# Patient Record
Sex: FEMALE | Race: WHITE | Employment: OTHER | URBAN - METROPOLITAN AREA
[De-identification: names, ages, dates, MRNs, and addresses within clinical notes are randomized per-mention and may not be internally consistent; named-entity substitution may affect disease eponyms.]

---

## 2017-01-04 ENCOUNTER — PATIENT OUTREACH (OUTPATIENT)
Dept: INTERNAL MEDICINE CLINIC | Age: 47
End: 2017-01-04

## 2017-01-12 ENCOUNTER — TELEPHONE (OUTPATIENT)
Dept: INTERNAL MEDICINE CLINIC | Age: 47
End: 2017-01-12

## 2017-01-13 ENCOUNTER — HOSPITAL ENCOUNTER (OUTPATIENT)
Dept: GENERAL RADIOLOGY | Age: 47
Discharge: HOME | End: 2017-01-13
Attending: SPECIALIST
Payer: COMMERCIAL

## 2017-01-13 DIAGNOSIS — Z18.10 RETAINED METAL FRAGMENTS, UNSPECIFIED: ICD-10-CM

## 2017-01-13 PROCEDURE — 74000 XR ABD (KUB): CPT

## 2017-02-04 ENCOUNTER — HOSPITAL ENCOUNTER (EMERGENCY)
Age: 47
Discharge: HOME | End: 2017-02-04
Attending: EMERGENCY MEDICINE
Payer: COMMERCIAL

## 2017-02-04 ENCOUNTER — APPOINTMENT (OUTPATIENT)
Dept: ULTRASOUND IMAGING | Age: 47
End: 2017-02-04
Attending: EMERGENCY MEDICINE
Payer: COMMERCIAL

## 2017-02-04 VITALS
BODY MASS INDEX: 25.31 KG/M2 | WEIGHT: 167 LBS | SYSTOLIC BLOOD PRESSURE: 103 MMHG | HEIGHT: 68 IN | HEART RATE: 96 BPM | RESPIRATION RATE: 16 BRPM | TEMPERATURE: 98.6 F | OXYGEN SATURATION: 97 % | DIASTOLIC BLOOD PRESSURE: 59 MMHG

## 2017-02-04 DIAGNOSIS — N64.4 BREAST PAIN, RIGHT: Primary | ICD-10-CM

## 2017-02-04 PROCEDURE — 99284 EMERGENCY DEPT VISIT MOD MDM: CPT

## 2017-02-04 PROCEDURE — 76642 ULTRASOUND BREAST LIMITED: CPT

## 2017-02-04 PROCEDURE — 76641 ULTRASOUND BREAST COMPLETE: CPT

## 2017-02-04 RX ORDER — OXYCODONE AND ACETAMINOPHEN 5; 325 MG/1; MG/1
2 TABLET ORAL
Status: DISCONTINUED | OUTPATIENT
Start: 2017-02-04 | End: 2017-02-04 | Stop reason: HOSPADM

## 2017-02-04 NOTE — ED NOTES
Introduced self to patient as student nurse with primary Zuletajose Ferreira 668. Monitor x2 placed on pt. Bed in low position and locked, call bell within reach. Pt updated on status- waiting for US. Pain 5 on scale of 0-10. Pt given instructions to notify RN if unacceptable to talk about pt care in front of visitors. Pt has no new complaints at this time. Vital signs stable as noted.

## 2017-02-04 NOTE — ED NOTES
Bedside and Verbal shift change report given to DAQUAN Chavis (oncoming nurse) by Fahad Mireles (offgoing nurse). Report included the following information SBAR, Kardex, ED Summary, MAR, Recent Results and Med Rec Status.

## 2017-02-04 NOTE — DISCHARGE INSTRUCTIONS
We hope that we have addressed all of your medical concerns. The examination and treatment you received in the Emergency Department were for an emergent problem and were not intended as complete care. It is important that you follow up with your healthcare provider(s) for ongoing care. If your symptoms worsen or do not improve as expected, and you are unable to reach your usual health care provider(s), you should return to the Emergency Department. Today's healthcare is undergoing tremendous change, and patient satisfaction surveys are one of the many tools to assess the quality of medical care. You may receive a survey from the [facility] regarding your experience in the Emergency Department. I hope that your experience has been completely positive, particularly the medical care that I provided. As such, please participate in the survey; anything less than excellent does not meet my expectations or intentions. [facility] and [facility] participate in nationally recognized quality of care measures. If your blood pressure is greater than 120/80, as reported below, we urge that you seek medical care to address the potential of high blood pressure, commonly known as hypertension. Hypertension can be hereditary or can be caused by certain medical conditions, pain, stress, or \"white coat syndrome. \"       Please make an appointment with your health care provider(s) for follow up of your Emergency Department visit. VITALS:   Patient Vitals for the past 8 hrs:   Temp Pulse Resp BP SpO2   02/04/17 0815 - - - 101/65 97 %   02/04/17 0800 - - - 99/58 97 %   02/04/17 0754 - - - 97/71 97 %   02/04/17 0731 - - - - 100 %   02/04/17 0722 - - - 101/60 -   02/04/17 0555 98.6 °F (37 °C) 96 16 103/59 97 %          Thank you for allowing us to provide you with medical care today. We realize that you have many choices for your emergency care needs. Please choose us in the future for any continued health care needs. MD FLORA Garza [facility]: [phone]            No results found for this or any previous visit (from the past 24 hour(s)). No results found. Breast Pain: Care Instructions  Your Care Instructions  Breast tenderness and pain may come and go with your monthly periods (cyclic), or it may not follow any pattern (noncyclic). Breast pain is rarely caused by a serious health problem. You may need tests to find the cause. Follow-up care is a key part of your treatment and safety. Be sure to make and go to all appointments, and call your doctor if you are having problems. Its also a good idea to know your test results and keep a list of the medicines you take. How can you care for yourself at home? · If your doctor gave you medicine, take it exactly as prescribed. Call your doctor if you think you are having a problem with your medicine. · Take an over-the-counter pain medicine, such as acetaminophen (Tylenol), ibuprofen (Advil, Motrin), or naproxen (Aleve), to relieve pain and swelling. Read and follow all instructions on the label. · Do not take two or more pain medicines at the same time unless the doctor told you to. Many pain medicines have acetaminophen, which is Tylenol. Too much acetaminophen (Tylenol) can be harmful. · Wear a supportive bra, such as a sports bra or a jog bra. · Cut down on the amount of fat in your diet. If you need help planning healthy meals, see a dietitian. · Get at least 30 minutes of exercise on most days of the week. Walking is a good choice. You also may want to do other activities, such as running, swimming, cycling, or playing tennis or team sports. · Keep a healthy sleep pattern. Go to bed at the same time every night, and get up at the same time every day. When should you call for help?   Call your doctor now or seek immediate medical care if:  · You have new changes in a breast, such as:  ¨ A lump or thickening in your breast or armpit. ¨ A change in the breast's size or shape. ¨ Skin changes, such as dimples or puckers. ¨ Nipple discharge. ¨ A change in the color or feel of the skin of your breast or the darker area around the nipple (areola). ¨ A change in the shape of the nipple (it may look like it's being pulled into the breast). · You have symptoms of a breast infection, such as:  ¨ Increased pain, swelling, redness, or warmth around a breast.  ¨ Red streaks extending from the breast.  ¨ Pus draining from a breast.  ¨ A fever. Watch closely for changes in your health, and be sure to contact your doctor if:  · Your breast pain does not get better after 1 week. · You have a lump or thickening in your breast or armpit. Where can you learn more? Go to http://thea-dee.info/. Enter N469 in the search box to learn more about \"Breast Pain: Care Instructions. \"  Current as of: May 27, 2016  Content Version: 11.1  © 4249-0179 [employer]. Care instructions adapted under license by [employer] (which disclaims liability or warranty for this information). If you have questions about a medical condition or this instruction, always ask your healthcare professional. [address] any warranty or liability for your use of this information.

## 2017-02-04 NOTE — ED PROVIDER NOTES
HPI Comments: The patient is a 40 year female with a past medical history significant for multiple breast biopsies on the right side, most recent one performed approximately 3-4 months ago, who presents to the ED with increased right sided breast pain and swelling that began yesterday and progressively worsen through the evening. The patient took Tylenol for discomfort, then progress to Percocet, last night, however, she was able to only getting minimal relief of discomfort. The patient spoke to her oncologist and was instructed to come to the ER for further evaluation. She described a dull, throbbing discomfort, severity 8/10, constant, without any aggravating or relieving factors. The patient denies any fever, headache, sore throat, cough, congestion, shortness of breath, nausea, vomiting, diarrhea, constipation, dysuria, dizziness, weakness, numbness. Patient is a 40 y.o. female presenting with breast pain.    Breast pain           Past Medical History:   Diagnosis Date    Allergic rhinitis, cause unspecified     Calculus of kidney     Calculus of kidney     Colon polyp 11/14/2016    Depression     Headache(784.0)     Hepatic cyst 9/7/2016    HX OTHER MEDICAL      migraines and kidney stones    Migraine        Past Surgical History:   Procedure Laterality Date    Hx tonsil and adenoidectomy  1978    Hx colonoscopy  2016    Hx other surgical       broken right arm x2 - no surgery    Hx heent       tonsils and adenoids - once as of 11/11/2016    Colonoscopy N/A 11/14/2016     COLONOSCOPY WITH ENDOSCOPIC SUBMUCOSAL DISSECTION (ESD) performed by Violette Ndiaye MD at [address] Hx gyn       novasure procedure    Hx gyn       hysteroctomy 2016         Family History:   Problem Relation Age of Onset    Thyroid Disease Mother     Cancer Mother      skin - BCCA/squamous    Hypertension Father     Heart Disease Father     Other Father      ankylosing spondylitis    Elevated Lipids Father  Hypertension Paternal Grandmother     Elevated Lipids Paternal Grandmother     Breast Cancer Maternal Aunt     Cancer Maternal Aunt      breast    Heart Disease Paternal Grandfather        Social History     Social History    Marital status:      Spouse name: Jessy Hankins ()    Number of children: N/A    Years of education: N/A     Occupational History    Homemaker      Social History Main Topics    Smoking status: Never Smoker    Smokeless tobacco: Never Used    Alcohol use Yes      Comment: 1-2 drinks per week    Drug use: No    Sexual activity: Yes     Partners: Male     Birth control/ protection: None     Other Topics Concern    Not on file     Social History Narrative         ALLERGIES: Lortab [hydrocodone-acetaminophen]; Adhesive tape-silicones; Fioricet [butalbital-acetaminophen-caff]; and Topamax [topiramate]    Review of Systems    Vitals:    02/04/17 0555   BP: 103/59   Pulse: 96   Resp: 16   Temp: 98.6 °F (37 °C)   SpO2: 97%   Weight: 75.8 kg (167 lb)   Height: 5' 8\" (1.727 m)            Physical Exam   Nursing note and vitals reviewed. CONSTITUTIONAL: Well-appearing; well-nourished; in no apparent distress  HEAD: Normocephalic; atraumatic  EYES: PERRL; EOM intact; conjunctiva and sclera are clear bilaterally. ENT: No rhinorrhea; normal pharynx with no tonsillar hypertrophy; mucous membranes pink/moist, no erythema, no exudate. NECK: Supple; non-tender; no cervical lymphadenopathy  CARD: Normal S1, S2; no murmurs, rubs, or gallops. Regular rate and rhythm. RESP: Normal respiratory effort; breath sounds clear and equal bilaterally; no wheezes, rhonchi, or rales. ABD: Normal bowel sounds; non-distended; non-tender; no palpable organomegaly, no masses, no bruits. Back Exam: Normal inspection; no vertebral point tenderness, no CVA tenderness. Normal range of motion.   EXT: Normal ROM in all four extremities; non-tender to palpation; no swelling or deformity; distal pulses are normal, no edema. SKIN: Warm; dry; no rash. NEURO:Alert and oriented x 3, coherent, JASON-XII grossly intact, sensory and motor are non-focal.        MDM  Number of Diagnoses or Management Options  Diagnosis management comments: Assessment: 71-year-old female with worsening breast pain and swelling. The patient has a history of breast biopsies-suspect localized hematoma. Rule out hemorrhage. Plan: analgesia/ ultrasound soft tissue breast/ serial exam/ consult breast surgeon/ Monitor and Reevaluate. Amount and/or Complexity of Data Reviewed  Clinical lab tests: ordered and reviewed  Tests in the radiology section of CPT®: ordered and reviewed  Tests in the medicine section of CPT®: reviewed and ordered  Discussion of test results with the performing providers: yes  Decide to obtain previous medical records or to obtain history from someone other than the patient: yes  Obtain history from someone other than the patient: yes  Review and summarize past medical records: yes  Discuss the patient with other providers: yes  Independent visualization of images, tracings, or specimens: yes    Risk of Complications, Morbidity, and/or Mortality  Presenting problems: moderate  Diagnostic procedures: moderate  Management options: moderate      ED Course       Procedures     . CONSULT NOTE:  Jacinda Lira MD spoke with Dr. Harsha Henderson of [facility]. Discussed patient's presentation, history, physical assessment, and available diagnostic results. Wants patient discharged home and will follow up in the office for outpatient reevaluation and work-up as deemed appropriate. Progress Note:   Pt has been reexamined by Jacinda Lira MD. Pt is feeling much better. Symptoms have improved. All available results have been reviewed with pt and any available family. Pt understands sx, dx, and tx in ED. Care plan has been outlined and questions have been answered. Pt is ready to go home.  Will send home on Breast pain/ Hematoma instructions. Outpatient referral with Dr. Harsha Henderson in 2 days for reevaluation and further treatment as needed. Written by Jacinda Lira MD,7:38 AM    .   .

## 2017-02-04 NOTE — ED TRIAGE NOTES
Patient arrives with c/o RIGHT breast pain onset yesterday morning. Patient has a hx of biopsies. Patient states the pain is in the area where the first biopsy was done. Patient took 1g of Tylenol at 0500 this morning with some relief and zofran.     pts first biopsy was the first part of December 2016

## 2017-02-24 ENCOUNTER — TELEPHONE (OUTPATIENT)
Dept: INTERNAL MEDICINE CLINIC | Age: 47
End: 2017-02-24

## 2017-02-24 NOTE — TELEPHONE ENCOUNTER
Incoming call from patient advising she wanted to update  on a few things. Patient stated about 2 weeks following her hysterectomy she started to experience bleeding from her nipple. She has a breat MRI scheduled for march 8th to have everything evaluated. She has had prior testing but nothing seen to cause the bleeding. Advised patient to make sure our office gets a report of the MRI to review with . Patient voiced understanding.

## 2017-02-28 DIAGNOSIS — G43.009 NONINTRACTABLE MIGRAINE, UNSPECIFIED MIGRAINE TYPE: ICD-10-CM

## 2017-02-28 RX ORDER — RIZATRIPTAN BENZOATE 10 MG/1
10 TABLET, ORALLY DISINTEGRATING ORAL
Qty: 9 TAB | Refills: 2 | Status: SHIPPED | OUTPATIENT
Start: 2017-02-28 | End: 2019-05-06 | Stop reason: SDUPTHER

## 2017-03-14 ENCOUNTER — OFFICE VISIT (OUTPATIENT)
Dept: INTERNAL MEDICINE CLINIC | Age: 47
End: 2017-03-14

## 2017-03-14 VITALS
HEART RATE: 82 BPM | DIASTOLIC BLOOD PRESSURE: 82 MMHG | WEIGHT: 168 LBS | TEMPERATURE: 98.4 F | HEIGHT: 68 IN | OXYGEN SATURATION: 98 % | BODY MASS INDEX: 25.46 KG/M2 | RESPIRATION RATE: 18 BRPM | SYSTOLIC BLOOD PRESSURE: 120 MMHG

## 2017-03-14 DIAGNOSIS — K63.5 COLON POLYP: ICD-10-CM

## 2017-03-14 DIAGNOSIS — R63.5 WEIGHT GAIN: ICD-10-CM

## 2017-03-14 DIAGNOSIS — N64.59 BLEEDING FROM BREAST: Primary | ICD-10-CM

## 2017-03-14 NOTE — MR AVS SNAPSHOT
Visit Information Date & Time Provider Department Dept. Phone Encounter #  
 3/14/2017  1:15 PM Guillaume Radford MD [facility] [address] [id number] Follow-up Instructions Return if symptoms worsen or fail to improve. Your Appointments 3/20/2017 10:40 AM  
New Patient with Diego Ramirez MD  
[facility] ([facility]) Appt Note: RIGHT breast [email or web address] [email or web address] 3/10/17 TT  
 [place] [address]  
  
   
 [place] [address] [address] [facility] Upcoming Health Maintenance Date Due  
 PAP AKA CERVICAL CYTOLOGY 5/22/2016 COLONOSCOPY 11/14/2017 DTaP/Tdap/Td series (2 - Td) 1/1/2020 Allergies as of 3/14/2017  Review Complete On: 3/14/2017 By: Guillaume Radford MD  
  
 Severity Noted Reaction Type Reactions Lortab [Hydrocodone-acetaminophen] Medium 02/28/2011   Side Effect Nausea and Vomiting Nausea and vomiting Adhesive Tape-silicones  37/63/6238    Rash With extended use of the tape. Fioricet [Butalbital-acetaminophen-caff]  07/22/2013    Other (comments) Insomnia. Pt does not recall being allergic to this. Topamax [Topiramate]  02/28/2011    Other (comments) Memory loss - short term memory. Current Immunizations  Reviewed on 10/10/2011 Name Date Influenza Vaccine (Quad) PF 9/28/2016 10:29 AM  
 Influenza Vaccine PF 9/25/2013 Influenza Vaccine Split 10/10/2011 TDAP Vaccine 1/1/2010 Not reviewed this visit You Were Diagnosed With   
  
 Codes Comments Colon polyp    -  Primary ICD-10-CM: A48.2 ICD-9-CM: 211.3 Bleeding from breast     ICD-10-CM: N64.59 
ICD-9-CM: 611.79 Weight gain     ICD-10-CM: R63.5 ICD-9-CM: 783.1 Vitals BP Pulse Temp Resp Height(growth percentile) Weight(growth percentile) 120/82 82 98.4 °F (36.9 °C) (Oral) 18 5' 8\" (1.727 m) 168 lb (76.2 kg) LMP SpO2 BMI OB Status Smoking Status 11/02/2016 98% 25.54 kg/m2 Hysterectomy Never Smoker Vitals History BMI and BSA Data Body Mass Index Body Surface Area 25.54 kg/m 2 1.91 m 2 Preferred Pharmacy Pharmacy Name Phone [facility], [address] [phone] Your Updated Medication List  
  
   
This list is accurate as of: 3/14/17  2:14 PM.  Always use your most recent med list.  
  
  
  
  
 NASACORT 55 mcg nasal inhaler Generic drug:  triamcinolone 2 Sprays by Both Nostrils route daily. Orlistat 60 mg capsule Take 60 mg by mouth three (3) times daily (with meals). rizatriptan 10 mg disintegrating tablet Commonly known as:  MAXALT-MLT Take 1 Tab by mouth once as needed for Migraine for up to 1 dose. sertraline 100 mg tablet Commonly known as:  ZOLOFT  
TAKE ONE TABLET BY MOUTH DAILY ZyrTEC 10 mg tablet Generic drug:  cetirizine Take 10 mg by mouth daily. Follow-up Instructions Return if symptoms worsen or fail to improve. Introducing \[facility]\"" & HEALTH SERVICES! Dear Rama Dear: Thank you for requesting a Care Thread account. Our records indicate that you already have an active Care Thread account. You can access your account anytime at [email or web address] Did you know that you can access your hospital and ER discharge instructions at any time in Care Thread? You can also review all of your test results from your hospital stay or ER visit. Additional Information If you have questions, please visit the Frequently Asked Questions section of the Care Thread website at [email or web address] Remember, Care Thread is NOT to be used for urgent needs. For medical emergencies, dial 911. Now available from your iPhone and Android! Please provide this summary of care documentation to your next provider. Your primary care clinician is listed as Jad Benavides. If you have any questions after today's visit, please call [phone].

## 2017-03-14 NOTE — PROGRESS NOTES
Ofelia Braun is a 40 y.o. female who presents today for Breast Problem (benign papilloma; had biopsy; has large hematoma on left breast from biopsy) and Weight Management (discuss weight gain)      She has a history of   Patient Active Problem List   Diagnosis Code    Allergic rhinitis, cause unspecified J30.9    Calculus of kidney N20.0    Hepatic cyst K76.89    Splenic cyst D73.4    Migraine without aura and without status migrainosus, not intractable G43.009    Colon polyp K63.5    Right upper quadrant abdominal pain R10.11   . Today patient is here for follow up  . she does have other concerns. Breast Biopsy: R breast on December 1st. Had bleeding complications. Seen in Jan and had ductogram which was inconclusive. Breast MRI inconclusive and had to have this repeated. MRI then recnently repeated and biopsy of L breast was done last week. Since having a lot of bruising and pain. Still bleeding. Pt will be seeing Dr. Selena Burnett next Monday. Pt notes pain on the L breast. Large bruising. + bleeding. Pt had biopsy by Dr. Dill. Hysterectomy: done at [address] [place] [address] on 2/3/17. Had hysterectomy with bilateral salpingectomy due to cyst. Pain improved, but not resolved. Tolerable pain. Colon polyp: had this removed in November. Repeat in one year. Weight gain: notes decreased exercise tolerance. ROS  Review of Systems   Constitutional: Negative for chills, fever and weight loss. HENT: Negative for congestion and sore throat. Eyes: Negative for blurred vision, double vision and photophobia. Respiratory: Negative for cough and shortness of breath. Cardiovascular: Negative for chest pain, palpitations and leg swelling. Gastrointestinal: Negative for abdominal pain, constipation, diarrhea, heartburn, nausea and vomiting. Genitourinary: Negative for dysuria, frequency and urgency. Musculoskeletal: Positive for joint pain (L foot pain). Negative for myalgias. Skin: Negative for rash. Neurological: Negative. Negative for headaches. Endo/Heme/Allergies: Does not bruise/bleed easily. Psychiatric/Behavioral: Negative for memory loss and suicidal ideas. Visit Vitals    /82    Pulse 82    Temp 98.4 °F (36.9 °C) (Oral)    Resp 18    Ht 5' 8\" (1.727 m)    Wt 168 lb (76.2 kg)    SpO2 98%    BMI 25.54 kg/m2       Physical Exam   Constitutional: She is oriented to person, place, and time. She appears well-developed and well-nourished. No distress. HENT:   Head: Normocephalic and atraumatic. Neck: Normal range of motion. Neck supple. No thyromegaly present. Cardiovascular: Normal rate and regular rhythm. No murmur heard. Pulmonary/Chest: Effort normal and breath sounds normal. She has no wheezes. Significant bruising of entire L breast. Incision clean but still open. Abdominal: She exhibits no distension. Musculoskeletal: Normal range of motion. She exhibits no edema. Neurological: She is alert and oriented to person, place, and time. No cranial nerve deficit. Skin: Skin is warm and dry. Psychiatric: She has a normal mood and affect. Her behavior is normal.         Current Outpatient Prescriptions   Medication Sig    Orlistat 60 mg capsule Take 60 mg by mouth three (3) times daily (with meals).  rizatriptan (MAXALT-MLT) 10 mg disintegrating tablet Take 1 Tab by mouth once as needed for Migraine for up to 1 dose.  sertraline (ZOLOFT) 100 mg tablet TAKE ONE TABLET BY MOUTH DAILY    triamcinolone (NASACORT) 55 mcg nasal inhaler 2 Sprays by Both Nostrils route daily.  cetirizine (ZYRTEC) 10 mg tablet Take 10 mg by mouth daily. No current facility-administered medications for this visit.          Past Medical History:   Diagnosis Date    Allergic rhinitis, cause unspecified     Calculus of kidney     Calculus of kidney     Colon polyp 11/14/2016    Depression     Headache(784.0)     Hepatic cyst 9/7/2016    HX OTHER MEDICAL     migraines and kidney stones    Migraine       Past Surgical History:   Procedure Laterality Date    COLONOSCOPY N/A 11/14/2016    COLONOSCOPY WITH ENDOSCOPIC SUBMUCOSAL DISSECTION (ESD) performed by Devan David MD at [facility] ENDOSCOPY    HX COLONOSCOPY  2016    HX GYN      novasure procedure    HX GYN      hysteroctomy 2016    HX HEENT      tonsils and adenoids - once as of 11/11/2016    HX OTHER SURGICAL      broken right arm x2 - no surgery    HX TONSIL AND ADENOIDECTOMY  1978      Social History   Substance Use Topics    Smoking status: Never Smoker    Smokeless tobacco: Never Used    Alcohol use Yes      Comment: 1-2 drinks per week      Family History   Problem Relation Age of Onset    Thyroid Disease Mother     Cancer Mother      skin - BCCA/squamous    Hypertension Father     Heart Disease Father     Other Father      ankylosing spondylitis    Elevated Lipids Father     Hypertension Paternal Grandmother     Elevated Lipids Paternal Grandmother     Breast Cancer Maternal Aunt     Cancer Maternal Aunt      breast    Heart Disease Paternal Grandfather         Allergies   Allergen Reactions    Lortab [Hydrocodone-Acetaminophen] Nausea and Vomiting     Nausea and vomiting    Adhesive Tape-Silicones Rash     With extended use of the tape.  Fioricet [Butalbital-Acetaminophen-Caff] Other (comments)     Insomnia. Pt does not recall being allergic to this.  Topamax [Topiramate] Other (comments)     Memory loss - short term memory. Assessment/Plan  Tahir Smith was seen today for breast problem and weight management. Diagnoses and all orders for this visit:    Bleeding from breast - with very large hematoma. Concerning as this seems excessive for a biopsy. Would like seen sooner than next week by breast surgeon. Appt with Dr. Talia Snyder for Thursday. PRN tylenol at home     Colon polyp - s/p resection. Weight gain - Pt notes about 10# as she cannot exercise.  Explained that at this time her BMI is still normal. Can try orlistat. Could also otherwise discuss phentermine in the future. She would like to get back to exercising, but cannot right now due to surgeries and foot. Over 50% of a visit of 25 minutes spent reviewing diagnosis and treatment options and side effects of medicines. Other orders  -     Cancel: REFERRAL TO BREAST SURGERY      Follow-up Disposition:  Return if symptoms worsen or fail to improve.     Mg Jara MD  3/14/2017

## 2017-03-16 ENCOUNTER — OFFICE VISIT (OUTPATIENT)
Dept: SURGERY | Age: 47
End: 2017-03-16

## 2017-03-16 VITALS
DIASTOLIC BLOOD PRESSURE: 76 MMHG | HEART RATE: 70 BPM | HEIGHT: 68 IN | WEIGHT: 169 LBS | BODY MASS INDEX: 25.61 KG/M2 | SYSTOLIC BLOOD PRESSURE: 112 MMHG

## 2017-03-16 DIAGNOSIS — N64.52 DISCHARGE FROM BOTH NIPPLES: ICD-10-CM

## 2017-03-16 DIAGNOSIS — N64.89 HEMATOMA OF BREAST: Primary | ICD-10-CM

## 2017-03-16 NOTE — MR AVS SNAPSHOT
Visit Information Date & Time Provider Department Dept. Phone Encounter #  
 3/16/2017 11:00 AM [facility] [id number] Upcoming Health Maintenance Date Due  
 PAP AKA CERVICAL CYTOLOGY 5/22/2016 COLONOSCOPY 11/14/2017 DTaP/Tdap/Td series (2 - Td) 1/1/2020 Allergies as of 3/16/2017  Review Complete On: 3/16/2017 By: Isabel Robison MD  
  
 Severity Noted Reaction Type Reactions Latex  03/16/2017    Unknown (comments) Lortab [Hydrocodone-acetaminophen] Medium 02/28/2011   Side Effect Nausea and Vomiting Nausea and vomiting Adhesive Tape-silicones  98/38/1317    Rash With extended use of the tape. Fioricet [Butalbital-acetaminophen-caff]  07/22/2013    Other (comments) Insomnia. Pt does not recall being allergic to this. Topamax [Topiramate]  02/28/2011    Other (comments) Memory loss - short term memory. Current Immunizations  Reviewed on 10/10/2011 Name Date Influenza Vaccine (Quad) PF 9/28/2016 10:29 AM  
 Influenza Vaccine PF 9/25/2013 Influenza Vaccine Split 10/10/2011 TDAP Vaccine 1/1/2010 Not reviewed this visit You Were Diagnosed With   
  
 Codes Comments Hematoma of breast    -  Primary ICD-10-CM: U86.06 ICD-9-CM: 611.89 Discharge from both nipples     ICD-10-CM: N64.52 
ICD-9-CM: 611.79 Vitals BP Pulse Height(growth percentile) Weight(growth percentile) LMP BMI  
 112/76 70 5' 8\" (1.727 m) 169 lb (76.7 kg) 11/02/2016 25.7 kg/m2 OB Status Smoking Status Hysterectomy Never Smoker Vitals History BMI and BSA Data Body Mass Index Body Surface Area 25.7 kg/m 2 1.92 m 2 Preferred Pharmacy Pharmacy Name Phone [facility] [address], [address] [phone] Your Updated Medication List  
  
   
 This list is accurate as of: 3/16/17  3:20 PM.  Always use your most recent med list.  
  
  
  
  
 NASACORT 55 mcg nasal inhaler Generic drug:  triamcinolone 2 Sprays by Both Nostrils route daily. Orlistat 60 mg capsule Take 60 mg by mouth three (3) times daily (with meals). rizatriptan 10 mg disintegrating tablet Commonly known as:  MAXALT-MLT Take 1 Tab by mouth once as needed for Migraine for up to 1 dose. sertraline 100 mg tablet Commonly known as:  ZOLOFT  
TAKE ONE TABLET BY MOUTH DAILY ZyrTEC 10 mg tablet Generic drug:  cetirizine Take 10 mg by mouth daily. Patient Instructions Breast Self-Exam: Care Instructions Your Care Instructions A breast self-exam is when you check your breasts for lumps or changes. This regular exam helps you learn how your breasts normally look and feel. Most breast problems or changes are not because of cancer. Breast self-exam is not a substitute for a mammogram. Having regular breast exams by your doctor and regular mammograms improve your chances of finding any problems with your breasts. Some women set a time each month to do a step-by-step breast self-exam. Other women like a less formal system. They might look at their breasts as they brush their teeth, or feel their breasts once in a while in the shower. If you notice a change in your breast, tell your doctor. Follow-up care is a key part of your treatment and safety. Be sure to make and go to all appointments, and call your doctor if you are having problems. Its also a good idea to know your test results and keep a list of the medicines you take. How do you do a breast self-exam? 
· The best time to examine your breasts is usually one week after your menstrual period begins. Your breasts should not be tender then. If you do not have periods, you might do your exam on a day of the month that is easy to remember. · To examine your breasts: ¨ Remove all your clothes above the waist and lie down. When you are lying down, your breast tissue spreads evenly over your chest wall, which makes it easier to feel all your breast tissue. ¨ Use the padsnot the fingertipsof the 3 middle fingers of your left hand to check your right breast. Move your fingers slowly in small coin-sized circles that overlap. ¨ Use three levels of pressure to feel of all your breast tissue. Use light pressure to feel the tissue close to the skin surface. Use medium pressure to feel a little deeper. Use firm pressure to feel your tissue close to your breastbone and ribs. Use each pressure level to feel your breast tissue before moving on to the next spot. ¨ Check your entire breast, moving up and down as if following a strip from the collarbone to the bra line, and from the armpit to the ribs. Repeat until you have covered the entire breast. 
¨ Repeat this procedure for your left breast, using the pads of the 3 middle fingers of your right hand. · To examine your breasts while in the shower: 
¨ Place one arm over your head and lightly soap your breast on that side. ¨ Using the pads of your fingers, gently move your hand over your breast (in the strip pattern described above), feeling carefully for any lumps or changes. ¨ Repeat for the other breast. 
· Have your doctor inspect anything you notice to see if you need further testing. Where can you learn more? Go to http://thea-dee.info/. Enter P148 in the search box to learn more about \"Breast Self-Exam: Care Instructions. \" Current as of: July 26, 2016 Content Version: 11.1 © 9530-1820 Debt Resolve. Care instructions adapted under license by [employer] (which disclaims liability or warranty for this information).  If you have questions about a medical condition or this instruction, always ask your healthcare professional. [employer] disclaims any warranty or liability for your use of this information. Breast Self-Exam: Care Instructions Your Care Instructions A breast self-exam is when you check your breasts for lumps or changes. This regular exam helps you learn how your breasts normally look and feel. Most breast problems or changes are not because of cancer. Breast self-exam is not a substitute for a mammogram. Having regular breast exams by your doctor and regular mammograms improve your chances of finding any problems with your breasts. Some women set a time each month to do a step-by-step breast self-exam. Other women like a less formal system. They might look at their breasts as they brush their teeth, or feel their breasts once in a while in the shower. If you notice a change in your breast, tell your doctor. Follow-up care is a key part of your treatment and safety. Be sure to make and go to all appointments, and call your doctor if you are having problems. Its also a good idea to know your test results and keep a list of the medicines you take. How do you do a breast self-exam? 
· The best time to examine your breasts is usually one week after your menstrual period begins. Your breasts should not be tender then. If you do not have periods, you might do your exam on a day of the month that is easy to remember. · To examine your breasts: ¨ Remove all your clothes above the waist and lie down. When you are lying down, your breast tissue spreads evenly over your chest wall, which makes it easier to feel all your breast tissue. ¨ Use the padsnot the fingertipsof the 3 middle fingers of your left hand to check your right breast. Move your fingers slowly in small coin-sized circles that overlap. ¨ Use three levels of pressure to feel of all your breast tissue. Use light pressure to feel the tissue close to the skin surface. Use medium pressure to feel a little deeper.  Use firm pressure to feel your tissue close to your breastbone and ribs. Use each pressure level to feel your breast tissue before moving on to the next spot. ¨ Check your entire breast, moving up and down as if following a strip from the collarbone to the bra line, and from the armpit to the ribs. Repeat until you have covered the entire breast. 
¨ Repeat this procedure for your left breast, using the pads of the 3 middle fingers of your right hand. · To examine your breasts while in the shower: 
¨ Place one arm over your head and lightly soap your breast on that side. ¨ Using the pads of your fingers, gently move your hand over your breast (in the strip pattern described above), feeling carefully for any lumps or changes. ¨ Repeat for the other breast. 
· Have your doctor inspect anything you notice to see if you need further testing. Where can you learn more? Go to http://thea-dee.info/. Enter P148 in the search box to learn more about \"Breast Self-Exam: Care Instructions. \" Current as of: July 26, 2016 Content Version: 11.1 © 9903-1123 [employer]. Care instructions adapted under license by [employer] (which disclaims liability or warranty for this information). If you have questions about a medical condition or this instruction, always ask your healthcare professional. [address] any warranty or liability for your use of this information. Introducing [facility]! Dear Tani Cox: Thank you for requesting a Tributes.com account. Our records indicate that you already have an active [email or web address] account. You can access your account anytime at [email or web address] Did you know that you can access your hospital and ER discharge instructions at any time in [email or web address]? You can also review all of your test results from your hospital stay or ER visit. Additional Information If you have questions, please visit the Frequently Asked Questions section of the Toonimo website at [email or web address] Remember, Toonimo is NOT to be used for urgent needs. For medical emergencies, dial 911. Now available from your iPhone and Android! Please provide this summary of care documentation to your next provider. Your primary care clinician is listed as Jad Benavides. If you have any questions after today's visit, please call [phone].

## 2017-03-16 NOTE — PROGRESS NOTES
HISTORY OF PRESENT ILLNESS  Jamia Bustillo is a 40 y.o. female. HPI NEW patient consult referred by Dr. Vicki Scruggs for possible papilloma. She had a routine mammogram 2016 at [facility] and had an ultrasound guided RIGHT benign biopsy in December. She had extensive bruising from the biopsy and some RIGHT nipple bloody discharge. She was sent for a ductogram, stereotactic biopsy, MRI and 2 more MRI guided biopsies. She has extensive bruising in the LEFT breast after her MRI biopsy and now has LEFT nipple bloody discharge. The RIGHT nipple discharge has resolved. [address]  2016 mammogram.  BIRADS 0.   2016 Ultrasound BIRADS 4: RIGHT 1cm complex cyst 10:00. Biopsy 2016: benign breast tissue  2017 Pt presented with RIGHT bloody discharge after her breast biopsy and was sent for a ductogram.  2017 stereotactic biopsy for 'right ductal narrowing': fibrocystic change  2017 breast MRI  BIRADS 4. 4 biopsies recommend  (2 LEFT and 2 RIGHT)  3/8/2017 MRI biopsy RIGHT 2:00: benign  3/9/2017 MRI biopsy LEFT 4:00: benign  Obstetric History       T2      TAB0   SAB0   E0   M0   L2    Obstetric Comments   Menarche:  14. LMP:2016  # of Children: 2  Age at Delivery of First Child: 40   Hysterectomy/oophorectomy: yes/no. Breast Bx: yes one on right. , 3 on left  Hx of Breast Feeding:  yes. BCP:  yes. Hormone therapy:  no.     Family History:  Maternal aunt had breast cancer. Mammogram at [facility], 2016, BIRADS 4  Breast MRI, 2017, BIRADS 4    Review of Systems   Constitutional: Negative. HENT: Negative. Eyes: Negative. Respiratory: Negative. Cardiovascular: Negative. Gastrointestinal: Positive for abdominal pain. Genitourinary: Negative. Musculoskeletal: Negative. Skin: Negative. Neurological: Negative. Endo/Heme/Allergies: Negative. Psychiatric/Behavioral: The patient is nervous/anxious.         Physical Exam Pulmonary/Chest: Right breast exhibits skin change (resolving ecchymosis UIQ, biopsy scar LOQ). Right breast exhibits no inverted nipple, no mass, no nipple discharge and no tenderness. Left breast exhibits mass (palpable hematoma LOQ), skin change (purple ecchymosis after MRI biopsy) and tenderness. Left breast exhibits no inverted nipple and no nipple discharge. Breasts are symmetrical.       Lymphadenopathy:     She has no cervical adenopathy. She has no axillary adenopathy. Right: No supraclavicular adenopathy present. Left: No supraclavicular adenopathy present. ASSESSMENT and PLAN    ICD-10-CM ICD-9-CM    1. Hematoma of breast N64.89 611.89    2. Discharge from both nipples N64.52 611.79        1. The discharge from the nipples is old blood from hematomas caused by breast biopsies. Pt did not have bloody nipple discharge before her biopsies. No evidence of a papilloma. RIGHT nipple discharge has resolved. LEFT nipple discharge will resolve when her hematoma is re-absorbed. It is unfortunate the traumatic bloody discharge led to a ductogram and another biopsy and an MRI and more benign biopsies. Resume yearly mammograms.

## 2017-03-16 NOTE — COMMUNICATION BODY
HISTORY OF PRESENT ILLNESS  Adair Montez is a 40 y.o. female. HPI NEW patient consult referred by Dr. Rupal Wood for possible papilloma. She had a routine mammogram 2016 at [facility] and had an ultrasound guided RIGHT benign biopsy in December. She had extensive bruising from the biopsy and some RIGHT nipple bloody discharge. She was sent for a ductogram, stereotactic biopsy, MRI and 2 more MRI guided biopsies. She has extensive bruising in the LEFT breast after her MRI biopsy and now has LEFT nipple bloody discharge. The RIGHT nipple discharge has resolved. [address]  2016 mammogram.  BIRADS 0.   2016 Ultrasound BIRADS 4: RIGHT 1cm complex cyst 10:00. Biopsy 2016: benign breast tissue  2017 Pt presented with RIGHT bloody discharge after her breast biopsy and was sent for a ductogram.  2017 stereotactic biopsy for 'right ductal narrowing': fibrocystic change  2017 breast MRI  BIRADS 4. 4 biopsies recommend  (2 LEFT and 2 RIGHT)  3/8/2017 MRI biopsy RIGHT 2:00: benign  3/9/2017 MRI biopsy LEFT 4:00: benign  Obstetric History       T2      TAB0   SAB0   E0   M0   L2    Obstetric Comments   Menarche:  14. LMP:2016  # of Children: 2  Age at Delivery of First Child: 40   Hysterectomy/oophorectomy: yes/no. Breast Bx: yes one on right. , 3 on left  Hx of Breast Feeding:  yes. BCP:  yes. Hormone therapy:  no.     Family History:  Maternal aunt had breast cancer. Mammogram at [facility], 2016, BIRADS 4  Breast MRI, 2017, BIRADS 4    Review of Systems   Constitutional: Negative. HENT: Negative. Eyes: Negative. Respiratory: Negative. Cardiovascular: Negative. Gastrointestinal: Positive for abdominal pain. Genitourinary: Negative. Musculoskeletal: Negative. Skin: Negative. Neurological: Negative. Endo/Heme/Allergies: Negative. Psychiatric/Behavioral: The patient is nervous/anxious.         Physical Exam Pulmonary/Chest: Right breast exhibits skin change (resolving ecchymosis UIQ, biopsy scar LOQ). Right breast exhibits no inverted nipple, no mass, no nipple discharge and no tenderness. Left breast exhibits mass (palpable hematoma LOQ), skin change (purple ecchymosis after MRI biopsy) and tenderness. Left breast exhibits no inverted nipple and no nipple discharge. Breasts are symmetrical.       Lymphadenopathy:     She has no cervical adenopathy. She has no axillary adenopathy. Right: No supraclavicular adenopathy present. Left: No supraclavicular adenopathy present. ASSESSMENT and PLAN    ICD-10-CM ICD-9-CM    1. Hematoma of breast N64.89 611.89    2. Discharge from both nipples N64.52 611.79        1. The discharge from the nipples is old blood from hematomas caused by breast biopsies. Pt did not have bloody nipple discharge before her biopsies. No evidence of a papilloma. RIGHT nipple discharge has resolved. LEFT nipple discharge will resolve when her hematoma is re-absorbed. It is unfortunate the traumatic bloody discharge led to a ductogram and another biopsy and an MRI and more benign biopsies. Resume yearly mammograms.

## 2017-03-16 NOTE — PATIENT INSTRUCTIONS
Breast Self-Exam: Care Instructions  Your Care Instructions  A breast self-exam is when you check your breasts for lumps or changes. This regular exam helps you learn how your breasts normally look and feel. Most breast problems or changes are not because of cancer. Breast self-exam is not a substitute for a mammogram. Having regular breast exams by your doctor and regular mammograms improve your chances of finding any problems with your breasts. Some women set a time each month to do a step-by-step breast self-exam. Other women like a less formal system. They might look at their breasts as they brush their teeth, or feel their breasts once in a while in the shower. If you notice a change in your breast, tell your doctor. Follow-up care is a key part of your treatment and safety. Be sure to make and go to all appointments, and call your doctor if you are having problems. Its also a good idea to know your test results and keep a list of the medicines you take. How do you do a breast self-exam?  · The best time to examine your breasts is usually one week after your menstrual period begins. Your breasts should not be tender then. If you do not have periods, you might do your exam on a day of the month that is easy to remember. · To examine your breasts:  ¨ Remove all your clothes above the waist and lie down. When you are lying down, your breast tissue spreads evenly over your chest wall, which makes it easier to feel all your breast tissue. ¨ Use the pads--not the fingertips--of the 3 middle fingers of your left hand to check your right breast. Move your fingers slowly in small coin-sized circles that overlap. ¨ Use three levels of pressure to feel of all your breast tissue. Use light pressure to feel the tissue close to the skin surface. Use medium pressure to feel a little deeper. Use firm pressure to feel your tissue close to your breastbone and ribs.  Use each pressure level to feel your breast tissue before moving on to the next spot. ¨ Check your entire breast, moving up and down as if following a strip from the collarbone to the bra line, and from the armpit to the ribs. Repeat until you have covered the entire breast.  ¨ Repeat this procedure for your left breast, using the pads of the 3 middle fingers of your right hand. · To examine your breasts while in the shower:  ¨ Place one arm over your head and lightly soap your breast on that side. ¨ Using the pads of your fingers, gently move your hand over your breast (in the strip pattern described above), feeling carefully for any lumps or changes. ¨ Repeat for the other breast.  · Have your doctor inspect anything you notice to see if you need further testing. Where can you learn more? Go to http://thea-dee.info/. Enter P148 in the search box to learn more about \"Breast Self-Exam: Care Instructions. \"  Current as of: July 26, 2016  Content Version: 11.1  © 1572-5839 Property Partner. Care instructions adapted under license by [employer] (which disclaims liability or warranty for this information). If you have questions about a medical condition or this instruction, always ask your healthcare professional. [place] [address] any warranty or liability for your use of this information. Breast Self-Exam: Care Instructions  Your Care Instructions  A breast self-exam is when you check your breasts for lumps or changes. This regular exam helps you learn how your breasts normally look and feel. Most breast problems or changes are not because of cancer. Breast self-exam is not a substitute for a mammogram. Having regular breast exams by your doctor and regular mammograms improve your chances of finding any problems with your breasts. Some women set a time each month to do a step-by-step breast self-exam. Other women like a less formal system.  They might look at their breasts as they brush their teeth, or feel their breasts once in a while in the shower. If you notice a change in your breast, tell your doctor. Follow-up care is a key part of your treatment and safety. Be sure to make and go to all appointments, and call your doctor if you are having problems. Its also a good idea to know your test results and keep a list of the medicines you take. How do you do a breast self-exam?  · The best time to examine your breasts is usually one week after your menstrual period begins. Your breasts should not be tender then. If you do not have periods, you might do your exam on a day of the month that is easy to remember. · To examine your breasts:  ¨ Remove all your clothes above the waist and lie down. When you are lying down, your breast tissue spreads evenly over your chest wall, which makes it easier to feel all your breast tissue. ¨ Use the pads--not the fingertips--of the 3 middle fingers of your left hand to check your right breast. Move your fingers slowly in small coin-sized circles that overlap. ¨ Use three levels of pressure to feel of all your breast tissue. Use light pressure to feel the tissue close to the skin surface. Use medium pressure to feel a little deeper. Use firm pressure to feel your tissue close to your breastbone and ribs. Use each pressure level to feel your breast tissue before moving on to the next spot. ¨ Check your entire breast, moving up and down as if following a strip from the collarbone to the bra line, and from the armpit to the ribs. Repeat until you have covered the entire breast.  ¨ Repeat this procedure for your left breast, using the pads of the 3 middle fingers of your right hand. · To examine your breasts while in the shower:  ¨ Place one arm over your head and lightly soap your breast on that side.   ¨ Using the pads of your fingers, gently move your hand over your breast (in the strip pattern described above), feeling carefully for any lumps or changes. ¨ Repeat for the other breast.  · Have your doctor inspect anything you notice to see if you need further testing. Where can you learn more? Go to http://thea-dee.info/. Enter P148 in the search box to learn more about \"Breast Self-Exam: Care Instructions. \"  Current as of: July 26, 2016  Content Version: 11.1  © 3385-1799 setObject. Care instructions adapted under license by [employer] (which disclaims liability or warranty for this information). If you have questions about a medical condition or this instruction, always ask your healthcare professional. [place] [address] any warranty or liability for your use of this information.

## 2017-05-01 ENCOUNTER — HOSPITAL ENCOUNTER (EMERGENCY)
Age: 47
Discharge: HOME | End: 2017-05-01
Attending: EMERGENCY MEDICINE
Payer: COMMERCIAL

## 2017-05-01 VITALS
BODY MASS INDEX: 24.25 KG/M2 | SYSTOLIC BLOOD PRESSURE: 108 MMHG | DIASTOLIC BLOOD PRESSURE: 60 MMHG | HEART RATE: 63 BPM | WEIGHT: 160 LBS | OXYGEN SATURATION: 100 % | TEMPERATURE: 98 F | RESPIRATION RATE: 16 BRPM | HEIGHT: 68 IN
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
## 2017-05-01 PROCEDURE — 77030002916 HC SUT ETHLN J&J -A

## 2017-05-01 PROCEDURE — 74011000250 HC RX REV CODE- 250: Performed by: EMERGENCY MEDICINE

## 2017-05-01 PROCEDURE — 77030018836 HC SOL IRR NACL ICUM -A

## 2017-05-01 PROCEDURE — 99283 EMERGENCY DEPT VISIT LOW MDM: CPT

## 2017-05-01 PROCEDURE — 75810000293 HC SIMP/SUPERF WND  RPR

## 2017-05-01 PROCEDURE — 74011250637 HC RX REV CODE- 250/637: Performed by: EMERGENCY MEDICINE

## 2017-05-01 RX ORDER — AMOXICILLIN AND CLAVULANATE POTASSIUM 875; 125 MG/1; MG/1
1 TABLET, FILM COATED ORAL 2 TIMES DAILY
Qty: 14 TAB | Refills: 0 | Status: SHIPPED | OUTPATIENT
Start: 2017-05-01 | End: 2017-08-17 | Stop reason: ALTCHOICE

## 2017-05-01 RX ORDER — LIDOCAINE HYDROCHLORIDE AND EPINEPHRINE 10; 10 MG/ML; UG/ML
1.5 INJECTION, SOLUTION INFILTRATION; PERINEURAL ONCE
Status: COMPLETED | OUTPATIENT
Start: 2017-05-01 | End: 2017-05-01

## 2017-05-01 RX ADMIN — LIDOCAINE HYDROCHLORIDE,EPINEPHRINE BITARTRATE 15 MG: 10; .01 INJECTION, SOLUTION INFILTRATION; PERINEURAL at 09:33

## 2017-05-01 RX ADMIN — BACITRACIN, NEOMYCIN, POLYMYXIN B 1 PACKET: 400; 3.5; 5 OINTMENT TOPICAL at 10:04

## 2017-05-01 NOTE — DISCHARGE INSTRUCTIONS
We hope that we have addressed all of your medical concerns. The examination and treatment you received in the Emergency Department were for an emergent problem and were not intended as complete care. It is important that you follow up with your healthcare provider(s) for ongoing care. If your symptoms worsen or do not improve as expected, and you are unable to reach your usual health care provider(s), you should return to the Emergency Department. Today's healthcare is undergoing tremendous change, and patient satisfaction surveys are one of the many tools to assess the quality of medical care. You may receive a survey from the [facility] regarding your experience in the Emergency Department. I hope that your experience has been completely positive, particularly the medical care that I provided. As such, please participate in the survey; anything less than excellent does not meet my expectations or intentions. [facility] and [address] [place] [address] participate in nationally recognized quality of care measures. If your blood pressure is greater than 120/80, as reported below, we urge that you seek medical care to address the potential of high blood pressure, commonly known as hypertension. Hypertension can be hereditary or can be caused by certain medical conditions, pain, stress, or \"white coat syndrome. \"       Please make an appointment with your health care provider(s) for follow up of your Emergency Department visit. VITALS:   Patient Vitals for the past 8 hrs:   Temp Pulse Resp BP SpO2   05/01/17 0905 98 °F (36.7 °C) 90 18 127/85 100 %          Thank you for allowing us to provide you with medical care today. We realize that you have many choices for your emergency care needs. Please choose us in the future for any continued health care needs.       Masoud Sutton MD    [facility]   Office: [phone]            No results found for this or any previous visit (from the past 24 hour(s)). No results found. Animal Bites: Care Instructions  Your Care Instructions  After an animal bite, the biggest concern is infection. The chance of infection depends on the type of animal that bit you, where on your body you were bitten, and your general health. Many animal bites are not closed with stitches, because this can increase the chance of infection. Your bite may take as little as 7 days or as long as several months to heal, depending on how bad it is. Taking good care of your wound at home will help it heal and reduce your chance of infection. The doctor has checked you carefully, but problems can develop later. If you notice any problems or new symptoms, get medical treatment right away. Follow-up care is a key part of your treatment and safety. Be sure to make and go to all appointments, and call your doctor if you are having problems. It's also a good idea to know your test results and keep a list of the medicines you take. How can you care for yourself at home? · If your doctor told you how to care for your wound, follow your doctor's instructions. If you did not get instructions, follow this general advice:  ¨ After 24 to 48 hours, gently wash the wound with clean water 2 times a day. Do not scrub or soak the wound. Don't use hydrogen peroxide or alcohol, which can slow healing. ¨ You may cover the wound with a thin layer of petroleum jelly, such as Vaseline, and a nonstick bandage. ¨ Apply more petroleum jelly and replace the bandage as needed. · After you shower, gently dry the wound with a clean towel. · If your doctor has closed the wound, cover the bandage with a plastic bag before you take a shower. · A small amount of skin redness and swelling around the wound edges and the stitches or staples is normal. Your wound may itch or feel irritated.  Do not scratch or rub the wound.  · Ask your doctor if you can take an over-the-counter pain medicine, such as acetaminophen (Tylenol), ibuprofen (Advil, Motrin), or naproxen (Aleve). Read and follow all instructions on the label. · Do not take two or more pain medicines at the same time unless the doctor told you to. Many pain medicines have acetaminophen, which is Tylenol. Too much acetaminophen (Tylenol) can be harmful. · If your bite puts you at risk for rabies, you will get a series of shots over the next few weeks to prevent rabies. Your doctor will tell you when to get the shots. It is very important that you get the full cycle of shots. Follow your doctor's instructions exactly. · You may need a tetanus shot if you have not received one in the last 5 years. · If your doctor prescribed antibiotics, take them as directed. Do not stop taking them just because you feel better. You need to take the full course of antibiotics. When should you call for help? Call your doctor now or seek immediate medical care if:  · The skin near the bite turns cold or pale or it changes color. · You lose feeling in the area near the bite, or it feels numb or tingly. · You have trouble moving a limb near the bite. · You have symptoms of infection, such as:  ¨ Increased pain, swelling, warmth, or redness near the wound. ¨ Red streaks leading from the wound. ¨ Pus draining from the wound. ¨ A fever. · Blood soaks through the bandage. Oozing small amounts of blood is normal.  · Your pain is getting worse. Watch closely for changes in your health, and be sure to contact your doctor if you are not getting better as expected. Where can you learn more? Go to http://thea-dee.info/. Enter T586 in the search box to learn more about \"Animal Bites: Care Instructions. \"  Current as of: May 27, 2016  Content Version: 11.2  © 0751-8808 [employer].  Care instructions adapted under license by [employer] (which disclaims liability or warranty for this information). If you have questions about a medical condition or this instruction, always ask your healthcare professional. [address] any warranty or liability for your use of this information.

## 2017-05-01 NOTE — ED PROVIDER NOTES
HPI Comments: 39 yo WF with hx migraine, depression presents with c/o dog bite. Reports around 7:30 this morning her 2 large dogs were playing outside. One dogs collar got hung up with other dog and he was being choked and dragged around. At one point stopped breathing. She went to cut the collar off and the dog being choked bit her. She did not realize it because she put him in her truck to go to the vet because he wasn't breathing but right ankle began to throb. Took a percocet pta. Her tetanus is utd and she reports her dogs rabies shots are utd    Denies chance of pregnancy . Recent hysterectomy    The history is provided by the patient.         Past Medical History:   Diagnosis Date    Allergic rhinitis, cause unspecified     Calculus of kidney     Calculus of kidney     Colon polyp 11/14/2016    Depression     Headache(784.0)     Hepatic cyst 9/7/2016    HX OTHER MEDICAL     migraines and kidney stones    Migraine        Past Surgical History:   Procedure Laterality Date    COLONOSCOPY N/A 11/14/2016    COLONOSCOPY WITH ENDOSCOPIC SUBMUCOSAL DISSECTION (ESD) performed by Ilia Lin MD at [address] HX COLONOSCOPY  2016    HX GYN      novasure procedure    HX HEENT      tonsils and adenoids - once as of 11/11/2016    HX HYSTERECTOMY  2016    HX OTHER SURGICAL      broken right arm x2 - no surgery    HX TONSIL AND ADENOIDECTOMY  1978         Family History:   Problem Relation Age of Onset    Thyroid Disease Mother     Cancer Mother      skin - BCCA/squamous    Hypertension Father     Heart Disease Father     Other Father      ankylosing spondylitis    Elevated Lipids Father     Hypertension Paternal Grandmother     Elevated Lipids Paternal Grandmother     Breast Cancer Maternal Aunt     Cancer Maternal Aunt      breast    Heart Disease Paternal Grandfather        Social History     Social History    Marital status:      Spouse name: Suzanne Degroot ()    Number of children: N/A    Years of education: N/A     Occupational History    Homemaker      Social History Main Topics    Smoking status: Never Smoker    Smokeless tobacco: Never Used    Alcohol use Yes      Comment: 1-2 drinks per week    Drug use: No    Sexual activity: Yes     Partners: Male     Birth control/ protection: None     Other Topics Concern    Not on file     Social History Narrative         ALLERGIES: Latex; Lortab [hydrocodone-acetaminophen]; Adhesive tape-silicones; Fioricet [butalbital-acetaminophen-caff]; and Topamax [topiramate]    Review of Systems   Constitutional: Negative for fever. Genitourinary:        Denies chance of pregnancy   Skin: Positive for wound. All other systems reviewed and are negative. There were no vitals filed for this visit. Physical Exam   Constitutional: She is oriented to person, place, and time. She appears well-developed and well-nourished. No distress. HENT:   Head: Normocephalic and atraumatic. Eyes: Conjunctivae are normal.   Neck: Normal range of motion. Cardiovascular: Normal rate, regular rhythm, normal heart sounds and intact distal pulses. Exam reveals no friction rub. No murmur heard. Pulmonary/Chest: Effort normal and breath sounds normal. No respiratory distress. She has no wheezes. She has no rales. She exhibits no tenderness. Abdominal: Soft. Bowel sounds are normal. She exhibits no distension. There is no tenderness. There is no rebound and no guarding. Musculoskeletal: Normal range of motion. She exhibits no edema or tenderness. Neurological: She is alert and oriented to person, place, and time. Coordination normal.   Skin: Skin is warm and dry. She is not diaphoretic. No pallor. Multiple small lacerations to lateral calf and 1 small 1 cm laceration to medial calf of right leg   Psychiatric:   tearful   Nursing note and vitals reviewed.        MDM  Number of Diagnoses or Management Options  Dog bite, initial encounter: Diagnosis management comments: Suture  Copious irrigation with 15psi to all lacerations with nl saline completed. Took 15 minutes. Tolerated well      Patient Progress  Patient progress: stable    ED Course       Wound Repair  Date/Time: 5/1/2017 9:56 AM  Performed by: attendingPreparation: skin prepped with Shur-Clens and sterile field established  Pre-procedure re-eval: Immediately prior to the procedure, the patient was reevaluated and found suitable for the planned procedure and any planned medications. Location details: right leg  Wound length:2.5 cm or less  Anesthesia: local infiltration    Anesthesia:  Anesthesia: local infiltration  Local Anesthetic: lidocaine 1% with epinephrine   Anesthetic total: 3 mL  Foreign bodies: no foreign bodies  Irrigation solution: saline  Irrigation method: syringe  Debridement: none  Skin closure: 5-0 nylon  Number of sutures: 1  Technique: simple  Dressing: antibiotic ointment  Patient tolerance: Patient tolerated the procedure well with no immediate complications  My total time at bedside, performing this procedure was 1-15 minutes. Repaired larger laceration with 1 stitch so can drain. Discussed signs of infections and reasons to return. Also discussed scarring. Pt agrees with plan and will follow up for suture removal and return if worsening sx.  Reviewed sx of compartment syndrome with pt though doubt will be an issue

## 2017-05-01 NOTE — ED NOTES
The patient was discharged home by Dr. Yolanda Sullivan and Hina Plascencia RN in stable condition. The patient is alert and oriented, is in no respiratory distress. The patient's diagnosis, condition and treatment were explained to patient or parent/guardian. The patient/responsible party expressed understanding. 1 prescriptions given to pt. No work/school note given to pt. A discharge plan has been developed. A  was not involved in the process. Aftercare instructions were given to the patient.

## 2017-05-01 NOTE — ED NOTES
Animal control called spoke with Linda Villegas pt aware advised to call when discharged to follow up

## 2017-05-01 NOTE — ED TRIAGE NOTES
Pt ambulatory to treatment area with c/o \"my dogs got tangled this morning and I went to help and one of them bit my right ankle about 0730. \"  Pt reports tetanus is UTD. Pt's dogs are UTD on vaccines. Pt reports taking a percocet after incident.

## 2017-05-08 ENCOUNTER — OFFICE VISIT (OUTPATIENT)
Dept: INTERNAL MEDICINE CLINIC | Age: 47
End: 2017-05-08

## 2017-05-08 VITALS
BODY MASS INDEX: 25.91 KG/M2 | OXYGEN SATURATION: 98 % | TEMPERATURE: 98.7 F | SYSTOLIC BLOOD PRESSURE: 107 MMHG | HEIGHT: 68 IN | HEART RATE: 61 BPM | DIASTOLIC BLOOD PRESSURE: 73 MMHG | RESPIRATION RATE: 18 BRPM | WEIGHT: 171 LBS

## 2017-05-08 DIAGNOSIS — Z48.02 VISIT FOR SUTURE REMOVAL: Primary | ICD-10-CM

## 2017-05-08 DIAGNOSIS — R63.5 WEIGHT GAIN: ICD-10-CM
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
## 2017-05-08 RX ORDER — DOXYCYCLINE 100 MG/1
100 TABLET ORAL 2 TIMES DAILY
Qty: 14 TAB | Refills: 0 | Status: SHIPPED | OUTPATIENT
Start: 2017-05-08 | End: 2017-05-15

## 2017-05-08 NOTE — MR AVS SNAPSHOT
Visit Information Date & Time Provider Department Dept. Phone Encounter #  
 5/8/2017  2:45 PM Loralie Hamman, MD [facility] [address] [id number] Follow-up Instructions Return in about 3 months (around 8/8/2017). Upcoming Health Maintenance Date Due  
 PAP AKA CERVICAL CYTOLOGY 5/22/2016 INFLUENZA AGE 9 TO ADULT 8/1/2017 COLONOSCOPY 11/14/2017 DTaP/Tdap/Td series (2 - Td) 1/1/2020 Allergies as of 5/8/2017  Review Complete On: 5/8/2017 By: Loralie Hamman, MD  
  
 Severity Noted Reaction Type Reactions Latex  03/16/2017    Unknown (comments) Lortab [Hydrocodone-acetaminophen] Medium 02/28/2011   Side Effect Nausea and Vomiting Nausea and vomiting Adhesive Tape-silicones  90/41/2058    Rash With extended use of the tape. Fioricet [Butalbital-acetaminophen-caff]  07/22/2013    Other (comments) Insomnia. Pt does not recall being allergic to this. Topamax [Topiramate]  02/28/2011    Other (comments) Memory loss - short term memory. Current Immunizations  Reviewed on 10/10/2011 Name Date Influenza Vaccine (Quad) PF 9/28/2016 10:29 AM  
 Influenza Vaccine PF 9/25/2013 Influenza Vaccine Split 10/10/2011 TDAP Vaccine 1/1/2010 Not reviewed this visit You Were Diagnosed With   
  
 Codes Comments Visit for suture removal    -  Primary ICD-10-CM: F67.31 
ICD-9-CM: V58.32 Dog bite, initial encounter     ICD-10-CM: W54. 0XXA ICD-9-CM: 879.8, E906.0 Weight gain     ICD-10-CM: R63.5 ICD-9-CM: 783.1 Vitals BP Pulse Temp Resp Height(growth percentile) Weight(growth percentile) 107/73 (BP 1 Location: Left arm, BP Patient Position: Sitting) 61 98.7 °F (37.1 °C) (Oral) 18 5' 8\" (1.727 m) 171 lb (77.6 kg) LMP SpO2 BMI OB Status Smoking Status 11/02/2016 98% 26 kg/m2 Hysterectomy Never Smoker Vitals History BMI and BSA Data Body Mass Index Body Surface Area 26 kg/m 2 1.93 m 2 Preferred Pharmacy Pharmacy Name Phone [facility], [address] [phone] Your Updated Medication List  
  
   
This list is accurate as of: 5/8/17  3:43 PM.  Always use your most recent med list.  
  
  
  
  
 amoxicillin-clavulanate 875-125 mg per tablet Commonly known as:  AUGMENTIN Take 1 Tab by mouth two (2) times a day. doxycycline 100 mg tablet Commonly known as:  ADOXA Take 1 Tab by mouth two (2) times a day for 7 days. NASACORT 55 mcg nasal inhaler Generic drug:  triamcinolone 2 Sprays by Both Nostrils route daily. rizatriptan 10 mg disintegrating tablet Commonly known as:  MAXALT-MLT Take 1 Tab by mouth once as needed for Migraine for up to 1 dose. sertraline 100 mg tablet Commonly known as:  ZOLOFT  
TAKE ONE TABLET BY MOUTH DAILY ZyrTEC 10 mg tablet Generic drug:  cetirizine Take 10 mg by mouth daily. Prescriptions Sent to Pharmacy Refills  
 doxycycline (ADOXA) 100 mg tablet 0 Sig: Take 1 Tab by mouth two (2) times a day for 7 days. Class: Normal  
 Pharmacy: [facility], [address] #: [phone] Route: Oral  
  
Follow-up Instructions Return in about 3 months (around 8/8/2017). Patient Instructions Animal Bites: Care Instructions Your Care Instructions After an animal bite, the biggest concern is infection. The chance of infection depends on the type of animal that bit you, where on your body you were bitten, and your general health. Many animal bites are not closed with stitches, because this can increase the chance of infection. Your bite may take as little as 7 days or as long as several months to heal, depending on how bad it is.  Taking good care of your wound at home will help it heal and reduce your chance of infection. The doctor has checked you carefully, but problems can develop later. If you notice any problems or new symptoms, get medical treatment right away. Follow-up care is a key part of your treatment and safety. Be sure to make and go to all appointments, and call your doctor if you are having problems. It's also a good idea to know your test results and keep a list of the medicines you take. How can you care for yourself at home? · If your doctor told you how to care for your wound, follow your doctor's instructions. If you did not get instructions, follow this general advice: ¨ After 24 to 48 hours, gently wash the wound with clean water 2 times a day. Do not scrub or soak the wound. Don't use hydrogen peroxide or alcohol, which can slow healing. ¨ You may cover the wound with a thin layer of petroleum jelly, such as Vaseline, and a nonstick bandage. ¨ Apply more petroleum jelly and replace the bandage as needed. · After you shower, gently dry the wound with a clean towel. · If your doctor has closed the wound, cover the bandage with a plastic bag before you take a shower. · A small amount of skin redness and swelling around the wound edges and the stitches or staples is normal. Your wound may itch or feel irritated. Do not scratch or rub the wound. · Ask your doctor if you can take an over-the-counter pain medicine, such as acetaminophen (Tylenol), ibuprofen (Advil, Motrin), or naproxen (Aleve). Read and follow all instructions on the label. · Do not take two or more pain medicines at the same time unless the doctor told you to. Many pain medicines have acetaminophen, which is Tylenol. Too much acetaminophen (Tylenol) can be harmful. · If your bite puts you at risk for rabies, you will get a series of shots over the next few weeks to prevent rabies. Your doctor will tell you when to get the shots.  It is very important that you get the full cycle of shots. Follow your doctor's instructions exactly. · You may need a tetanus shot if you have not received one in the last 5 years. · If your doctor prescribed antibiotics, take them as directed. Do not stop taking them just because you feel better. You need to take the full course of antibiotics. When should you call for help? Call your doctor now or seek immediate medical care if: · The skin near the bite turns cold or pale or it changes color. · You lose feeling in the area near the bite, or it feels numb or tingly. · You have trouble moving a limb near the bite. · You have symptoms of infection, such as: 
¨ Increased pain, swelling, warmth, or redness near the wound. ¨ Red streaks leading from the wound. ¨ Pus draining from the wound. ¨ A fever. · Blood soaks through the bandage. Oozing small amounts of blood is normal. 
· Your pain is getting worse. Watch closely for changes in your health, and be sure to contact your doctor if you are not getting better as expected. Where can you learn more? Go to http://thea-dee.info/. Enter Y552 in the search box to learn more about \"Animal Bites: Care Instructions. \" Current as of: May 27, 2016 Content Version: 11.2 © 0154-5100 [employer]. Care instructions adapted under license by [employer] (which disclaims liability or warranty for this information). If you have questions about a medical condition or this instruction, always ask your healthcare professional. [place] [address] any warranty or liability for your use of this information. Introducing [facility]! Dear Coco Zavala: Thank you for requesting a [employer] account. Our records indicate that you already have an active [employer] account. You can access your account anytime at [email or web address] Did you know that you can access your hospital and ER discharge instructions at any time in [employer]? You can also review all of your test results from your hospital stay or ER visit. Additional Information If you have questions, please visit the Frequently Asked Questions section of the [employer] website at [email or web address] Remember, [employer] is NOT to be used for urgent needs. For medical emergencies, dial 911. Now available from your iPhone and Android! Please provide this summary of care documentation to your next provider. Your primary care clinician is listed as Coco Floyd. If you have any questions after today's visit, please call [phone].

## 2017-05-08 NOTE — PATIENT INSTRUCTIONS
Animal Bites: Care Instructions  Your Care Instructions  After an animal bite, the biggest concern is infection. The chance of infection depends on the type of animal that bit you, where on your body you were bitten, and your general health. Many animal bites are not closed with stitches, because this can increase the chance of infection. Your bite may take as little as 7 days or as long as several months to heal, depending on how bad it is. Taking good care of your wound at home will help it heal and reduce your chance of infection. The doctor has checked you carefully, but problems can develop later. If you notice any problems or new symptoms, get medical treatment right away. Follow-up care is a key part of your treatment and safety. Be sure to make and go to all appointments, and call your doctor if you are having problems. It's also a good idea to know your test results and keep a list of the medicines you take. How can you care for yourself at home? · If your doctor told you how to care for your wound, follow your doctor's instructions. If you did not get instructions, follow this general advice:  ¨ After 24 to 48 hours, gently wash the wound with clean water 2 times a day. Do not scrub or soak the wound. Don't use hydrogen peroxide or alcohol, which can slow healing. ¨ You may cover the wound with a thin layer of petroleum jelly, such as Vaseline, and a nonstick bandage. ¨ Apply more petroleum jelly and replace the bandage as needed. · After you shower, gently dry the wound with a clean towel. · If your doctor has closed the wound, cover the bandage with a plastic bag before you take a shower. · A small amount of skin redness and swelling around the wound edges and the stitches or staples is normal. Your wound may itch or feel irritated. Do not scratch or rub the wound.   · Ask your doctor if you can take an over-the-counter pain medicine, such as acetaminophen (Tylenol), ibuprofen (Advil, Motrin), or naproxen (Aleve). Read and follow all instructions on the label. · Do not take two or more pain medicines at the same time unless the doctor told you to. Many pain medicines have acetaminophen, which is Tylenol. Too much acetaminophen (Tylenol) can be harmful. · If your bite puts you at risk for rabies, you will get a series of shots over the next few weeks to prevent rabies. Your doctor will tell you when to get the shots. It is very important that you get the full cycle of shots. Follow your doctor's instructions exactly. · You may need a tetanus shot if you have not received one in the last 5 years. · If your doctor prescribed antibiotics, take them as directed. Do not stop taking them just because you feel better. You need to take the full course of antibiotics. When should you call for help? Call your doctor now or seek immediate medical care if:  · The skin near the bite turns cold or pale or it changes color. · You lose feeling in the area near the bite, or it feels numb or tingly. · You have trouble moving a limb near the bite. · You have symptoms of infection, such as:  ¨ Increased pain, swelling, warmth, or redness near the wound. ¨ Red streaks leading from the wound. ¨ Pus draining from the wound. ¨ A fever. · Blood soaks through the bandage. Oozing small amounts of blood is normal.  · Your pain is getting worse. Watch closely for changes in your health, and be sure to contact your doctor if you are not getting better as expected. Where can you learn more? Go to http://thea-dee.info/. Enter P748 in the search box to learn more about \"Animal Bites: Care Instructions. \"  Current as of: May 27, 2016  Content Version: 11.2  © 3505-2049 [employer]. Care instructions adapted under license by [employer] (which disclaims liability or warranty for this information).  If you have questions about a medical condition or this instruction, always ask your healthcare professional. [place] [address] any warranty or liability for your use of this information.

## 2017-05-08 NOTE — PROGRESS NOTES
Paige Arciniega is a 40 y.o. female who presents today for Dog Bite (right ankle; \"bit by family dog last Monday\", \"feels tight, warm and tingly\")  . She has a history of   Patient Active Problem List   Diagnosis Code    Allergic rhinitis, cause unspecified J30.9    Calculus of kidney N20.0    Hepatic cyst K76.89    Splenic cyst D73.4    Migraine without aura and without status migrainosus, not intractable G43.009    Colon polyp K63.5    Right upper quadrant abdominal pain R10.11   . Today patient is here for an acute visit. Problem visit:  Paige Arciniega is here for complaint of dog bit to Both legs. L leg. This was due to a fight between her dogs. Seen in the ER. Problem began 7 day(s) ago. Severity is moderate  Character of problem: One deep puncture on R ankle and had suture placed to that area. Given Augmentin which she finished today. Some swelling and tingling to area of wound. Associated symptoms include: no puss, but some surrounding erythema. Treatments tried include: Augmentin      ROS  Review of Systems   Constitutional: Negative for chills, fever and weight loss. Notes continued weight gain   Respiratory: Negative for cough and hemoptysis. Cardiovascular: Negative for chest pain, palpitations and orthopnea. Gastrointestinal: Negative for abdominal pain, heartburn, nausea and vomiting. Genitourinary: Negative for dysuria, frequency and urgency. Musculoskeletal: Negative for back pain, myalgias and neck pain. Skin: Positive for rash. LLE   Psychiatric/Behavioral: Positive for depression (Stable). Visit Vitals    /73 (BP 1 Location: Left arm, BP Patient Position: Sitting)    Pulse 61    Temp 98.7 °F (37.1 °C) (Oral)    Resp 18    Ht 5' 8\" (1.727 m)    Wt 171 lb (77.6 kg)    SpO2 98%    BMI 26 kg/m2       Physical Exam   Constitutional: She is oriented to person, place, and time. She appears well-developed.    HENT:   Head: Normocephalic and atraumatic. Neck: Normal range of motion. Neck supple. Cardiovascular: Normal rate and regular rhythm. No murmur heard. Pulmonary/Chest: Effort normal and breath sounds normal. No respiratory distress. Musculoskeletal:        Legs:  Medial lesion with one suture. Removed. No pus, but some erythema around. Healing wounds to lateral leg. Neurological: She is alert and oriented to person, place, and time. Skin: Skin is warm. Suture removed from medial incision   Psychiatric: She has a normal mood and affect. Her behavior is normal.         Current Outpatient Prescriptions   Medication Sig    doxycycline (ADOXA) 100 mg tablet Take 1 Tab by mouth two (2) times a day for 7 days.  rizatriptan (MAXALT-MLT) 10 mg disintegrating tablet Take 1 Tab by mouth once as needed for Migraine for up to 1 dose.  sertraline (ZOLOFT) 100 mg tablet TAKE ONE TABLET BY MOUTH DAILY    cetirizine (ZYRTEC) 10 mg tablet Take 10 mg by mouth daily.  amoxicillin-clavulanate (AUGMENTIN) 875-125 mg per tablet Take 1 Tab by mouth two (2) times a day.  triamcinolone (NASACORT) 55 mcg nasal inhaler 2 Sprays by Both Nostrils route daily. No current facility-administered medications for this visit.          Past Medical History:   Diagnosis Date    Allergic rhinitis, cause unspecified     Calculus of kidney     Calculus of kidney     Colon polyp 11/14/2016    Depression     Headache     Hepatic cyst 9/7/2016    HX OTHER MEDICAL     migraines and kidney stones    Migraine       Past Surgical History:   Procedure Laterality Date    COLONOSCOPY N/A 11/14/2016    COLONOSCOPY WITH ENDOSCOPIC SUBMUCOSAL DISSECTION (ESD) performed by Thiago Arredondo MD at [address] HX COLONOSCOPY  2016    HX GYN      novasure procedure    HX HEENT      tonsils and adenoids - once as of 11/11/2016    HX HYSTERECTOMY  2016    HX OTHER SURGICAL      broken right arm x2 - no surgery    HX TONSIL AND ADENOIDECTOMY  1978      Social History   Substance Use Topics    Smoking status: Never Smoker    Smokeless tobacco: Never Used    Alcohol use Yes      Comment: 1-2 drinks per week      Family History   Problem Relation Age of Onset    Thyroid Disease Mother     Cancer Mother      skin - BCCA/squamous    Hypertension Father     Heart Disease Father     Other Father      ankylosing spondylitis    Elevated Lipids Father     Hypertension Paternal Grandmother     Elevated Lipids Paternal Grandmother     Breast Cancer Maternal Aunt     Cancer Maternal Aunt      breast    Heart Disease Paternal Grandfather         Allergies   Allergen Reactions    Latex Unknown (comments)    Lortab [Hydrocodone-Acetaminophen] Nausea and Vomiting     Nausea and vomiting    Adhesive Tape-Silicones Rash     With extended use of the tape.  Fioricet [Butalbital-Acetaminophen-Caff] Other (comments)     Insomnia. Pt does not recall being allergic to this.  Topamax [Topiramate] Other (comments)     Memory loss - short term memory. Assessment/Plan  Sharee Daniels was seen today for dog bite. Diagnoses and all orders for this visit:    Visit for suture removal - Removed stitch. No obvious pus, but will cover for 7 more days given mild cellulitic appearance. Keep area clean. -     doxycycline (ADOXA) 100 mg tablet; Take 1 Tab by mouth two (2) times a day for 7 days. Dog bite, initial encounter - s/p 7 days of augmentin. Other wounds look good. -     doxycycline (ADOXA) 100 mg tablet; Take 1 Tab by mouth two (2) times a day for 7 days. Weight gain - continued weight gain. Concerned that SSRI may be contributing. Trying to increase physical activity. Pt to try cutting back on dose once move is stable. Over 50% of a visit of 25 minutes spent reviewing diagnosis and treatment options and side effects of medicines. Follow-up Disposition:  Return in about 3 months (around 8/8/2017).     Loralie Hamman, MD  5/8/2017

## 2017-05-09 ENCOUNTER — TELEPHONE (OUTPATIENT)
Dept: SURGERY | Age: 47
End: 2017-05-09

## 2017-05-09 NOTE — TELEPHONE ENCOUNTER
The patient stopped into the office to make sure Dr. Cailin Bauer was aware that as far as her dog bite, she has now been cleared by her PCP. The LEFT breast hematoma is slowly re-absorbing and is less swollen. She has no further issues currently. She was appreciative for the phone call follow up.

## 2017-08-15 ENCOUNTER — TELEPHONE (OUTPATIENT)
Dept: INTERNAL MEDICINE CLINIC | Age: 47
End: 2017-08-15

## 2017-08-17 ENCOUNTER — OFFICE VISIT (OUTPATIENT)
Dept: INTERNAL MEDICINE CLINIC | Age: 47
End: 2017-08-17

## 2017-08-17 ENCOUNTER — TELEPHONE (OUTPATIENT)
Dept: INTERNAL MEDICINE CLINIC | Age: 47
End: 2017-08-17

## 2017-08-17 VITALS
HEIGHT: 68 IN | OXYGEN SATURATION: 98 % | RESPIRATION RATE: 16 BRPM | BODY MASS INDEX: 26.22 KG/M2 | TEMPERATURE: 98.2 F | DIASTOLIC BLOOD PRESSURE: 70 MMHG | HEART RATE: 64 BPM | SYSTOLIC BLOOD PRESSURE: 105 MMHG | WEIGHT: 173 LBS

## 2017-08-17 DIAGNOSIS — S39.92XA TAILBONE INJURY, INITIAL ENCOUNTER: Primary | ICD-10-CM
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
## 2017-08-17 DIAGNOSIS — R30.0 DYSURIA: ICD-10-CM

## 2017-08-17 LAB
BILIRUB UR QL STRIP: NEGATIVE
GLUCOSE UR-MCNC: NEGATIVE MG/DL
KETONES P FAST UR STRIP-MCNC: NEGATIVE MG/DL
PH UR STRIP: 5.5 [PH] (ref 4.6–8)
PROT UR QL STRIP: NEGATIVE MG/DL
SP GR UR STRIP: 1.03 (ref 1–1.03)
UA UROBILINOGEN AMB POC: NORMAL (ref 0.2–1)
URINALYSIS CLARITY POC: CLEAR
URINALYSIS COLOR POC: YELLOW
URINE BLOOD POC: NEGATIVE
URINE LEUKOCYTES POC: NEGATIVE
URINE NITRITES POC: NEGATIVE

## 2017-08-17 NOTE — PROGRESS NOTES
Roxi Peña is a 40 y.o. female who presents today for Fall and Pelvic Pain  . She has a history of   Patient Active Problem List   Diagnosis Code    Allergic rhinitis, cause unspecified J30.9    Calculus of kidney N20.0    Hepatic cyst K76.89    Splenic cyst D73.4    Migraine without aura and without status migrainosus, not intractable G43.009    Colon polyp K63.5    Right upper quadrant abdominal pain R10.11   . Today patient is here for an acute visit. she does not have other concerns. Problem visit:  Roxi Peña is here for complaint of tailbone pain. Problem began staturday while walking the dog. Was pulled down and fell on backside. Severity is moderate  Character of problem: Pain with pressure. Pressure and posture changes makes the problem worse. Nothing makes the problem better. Associated symptoms include: no bruising, Some sharp pelvic pains. No bleeding. Some pain with urination. No numbness/tingling in feet. Treatments tried include: Miralax. Tylenol for pain. Has stopped zoloft, doing well mentally. ROS  Review of Systems   Constitutional: Negative for chills, fever and weight loss. Respiratory: Negative for cough and shortness of breath. Cardiovascular: Negative for chest pain, palpitations, orthopnea, claudication and leg swelling. Gastrointestinal: Negative for abdominal pain, blood in stool, constipation, diarrhea, nausea and vomiting. Genitourinary: Positive for dysuria. Negative for flank pain, frequency, hematuria and urgency. Musculoskeletal: Positive for back pain. Negative for falls, joint pain, myalgias and neck pain. Lower back pain. Neurological: Negative. Endo/Heme/Allergies: Does not bruise/bleed easily. Psychiatric/Behavioral: Negative for depression, hallucinations, memory loss, substance abuse and suicidal ideas. The patient is not nervous/anxious and does not have insomnia.         Visit Vitals    /70 (BP 1 Location: Left arm, BP Patient Position: Sitting)    Pulse 64    Temp 98.2 °F (36.8 °C) (Oral)    Resp 16    Ht 5' 8\" (1.727 m)    Wt 173 lb (78.5 kg)    SpO2 98%    BMI 26.3 kg/m2       Physical Exam   Constitutional: She is oriented to person, place, and time. She appears well-developed and well-nourished. HENT:   Head: Normocephalic and atraumatic. Cardiovascular: Normal rate and regular rhythm. No murmur heard. Pulmonary/Chest: Effort normal. No respiratory distress. Abdominal:   No CVAT. Musculoskeletal:        Back:    Pain where noted. Normal LE ROM/stregnth. Neurological: She is alert and oriented to person, place, and time. Skin: Skin is warm and dry. Psychiatric: She has a normal mood and affect. Her behavior is normal.         Current Outpatient Prescriptions   Medication Sig    rizatriptan (MAXALT-MLT) 10 mg disintegrating tablet Take 1 Tab by mouth once as needed for Migraine for up to 1 dose.  triamcinolone (NASACORT) 55 mcg nasal inhaler 2 Sprays by Both Nostrils route daily.  cetirizine (ZYRTEC) 10 mg tablet Take 10 mg by mouth daily. No current facility-administered medications for this visit.          Past Medical History:   Diagnosis Date    Allergic rhinitis, cause unspecified     Calculus of kidney     Calculus of kidney     Colon polyp 11/14/2016    Depression     Headache     Hepatic cyst 9/7/2016    HX OTHER MEDICAL     migraines and kidney stones    Migraine       Past Surgical History:   Procedure Laterality Date    COLONOSCOPY N/A 11/14/2016    COLONOSCOPY WITH ENDOSCOPIC SUBMUCOSAL DISSECTION (ESD) performed by Marco Ny MD at [address] HX COLONOSCOPY  2016    HX GYN      novasure procedure    HX HEENT      tonsils and adenoids - once as of 11/11/2016    HX HYSTERECTOMY  2016    HX OTHER SURGICAL      broken right arm x2 - no surgery    HX TONSIL AND ADENOIDECTOMY  1978      Social History   Substance Use Topics  Smoking status: Never Smoker    Smokeless tobacco: Never Used    Alcohol use Yes      Comment: 1-2 drinks per week      Family History   Problem Relation Age of Onset    Thyroid Disease Mother     Cancer Mother      skin - BCCA/squamous    Hypertension Father     Heart Disease Father     Other Father      ankylosing spondylitis    Elevated Lipids Father     Hypertension Paternal Grandmother     Elevated Lipids Paternal Grandmother     Breast Cancer Maternal Aunt     Cancer Maternal Aunt      breast    Heart Disease Paternal Grandfather         Allergies   Allergen Reactions    Latex Unknown (comments)    Lortab [Hydrocodone-Acetaminophen] Nausea and Vomiting     Nausea and vomiting    Adhesive Tape-Silicones Rash     With extended use of the tape.  Fioricet [Butalbital-Acetaminophen-Caff] Other (comments)     Insomnia. Pt does not recall being allergic to this.  Topamax [Topiramate] Other (comments)     Memory loss - short term memory. Assessment/Plan  Diagnoses and all orders for this visit:    1. Tailbone injury, initial encounter - symptomatic treatment. Try getting doughnut pillow. 2. Dysuria - may be due to fall. Check UA.   -     AMB POC URINALYSIS DIP STICK AUTO W/O MICRO    3. Fall, initial encounter    UA negative. She is to let us know if this changes.        Follow-up Disposition: Not on File    Luis Fernando Heath MD  8/17/2017

## 2017-08-17 NOTE — TELEPHONE ENCOUNTER
Patient states she encountered a fall this past Saturday. She fell onto her bottom & is experiencing severe tailbone pain. She also states she is sharp pains in her ovary & doesn't know if this is related but she is unable to sleep because of the pain. She made an appt for tomorrow but asked to be seen today. Acute work-in appt scheduled for today at 1:00.

## 2017-08-17 NOTE — MR AVS SNAPSHOT
Visit Information Date & Time Provider Department Dept. Phone Encounter #  
 2017  1:00 PM Luellen Apgar, MD [facility] [address] [id number] Upcoming Health Maintenance Date Due  
 PAP AKA CERVICAL CYTOLOGY 2016 INFLUENZA AGE 9 TO ADULT 2017 COLONOSCOPY 2017 DTaP/Tdap/Td series (2 - Td) 2020 Allergies as of 2017  Review Complete On:  By: Robert Jiménezs Severity Noted Reaction Type Reactions Latex  2017    Unknown (comments) Lortab [Hydrocodone-acetaminophen] Medium 2011   Side Effect Nausea and Vomiting Nausea and vomiting Adhesive Tape-silicones      Rash With extended use of the tape. Fioricet [Butalbital-acetaminophen-caff]  2013    Other (comments) Insomnia. Pt does not recall being allergic to this. Topamax [Topiramate]  2011    Other (comments) Memory loss - short term memory. Current Immunizations  Reviewed on 10/10/2011 Name Date Influenza Vaccine (Quad) PF 2016 10:29 AM  
 Influenza Vaccine PF 2013 Influenza Vaccine Split 10/10/2011 TDAP Vaccine 2010 Not reviewed this visit You Were Diagnosed With   
  
 Codes Comments Sacral pain    -  Primary ICD-10-CM: M53.3 ICD-9-CM: 724.6 Dysuria     ICD-10-CM: R30.0 ICD-9-CM: 071. 1 Fall, initial encounter     ICD-10-CM: W19. Chaz Joy ICD-9-CM: E888.9 Vitals BP Pulse Temp Resp Height(growth percentile) Weight(growth percentile) 105/70 (BP 1 Location: Left arm, BP Patient Position: Sitting) 64 98.2 °F (36.8 °C) (Oral) 16 5' 8\" (1.727 m) 173 lb (78.5 kg) LMP SpO2 BMI OB Status Smoking Status 2016 98% 26.3 kg/m2 Hysterectomy Never Smoker Vitals History BMI and BSA Data Body Mass Index Body Surface Area  
 26.3 kg/m 2 1.94 m 2 Preferred Pharmacy Pharmacy Name Phone [facility], [address] [phone] Your Updated Medication List  
  
   
This list is accurate as of: 8/17/17  1:26 PM.  Always use your most recent med list.  
  
  
  
  
 amoxicillin-clavulanate 875-125 mg per tablet Commonly known as:  AUGMENTIN Take 1 Tab by mouth two (2) times a day. NASACORT 55 mcg nasal inhaler Generic drug:  triamcinolone 2 Sprays by Both Nostrils route daily. rizatriptan 10 mg disintegrating tablet Commonly known as:  MAXALT-MLT Take 1 Tab by mouth once as needed for Migraine for up to 1 dose. sertraline 100 mg tablet Commonly known as:  ZOLOFT  
TAKE ONE TABLET BY MOUTH DAILY ZyrTEC 10 mg tablet Generic drug:  cetirizine Take 10 mg by mouth daily. We Performed the Following AMB POC URINALYSIS DIP STICK AUTO W/O MICRO [57557 CPT(R)] Introducing [facility] & [facility] SERVICES! Dear Lawrence Mace: Thank you for requesting a [employer] account. Our records indicate that you already have an active [employer] account. You can access your account anytime at [email or web address] Did you know that you can access your hospital and ER discharge instructions at any time in [employer]? You can also review all of your test results from your hospital stay or ER visit. Additional Information If you have questions, please visit the Frequently Asked Questions section of the [employer] website at [email or web address] Remember, [employer] is NOT to be used for urgent needs. For medical emergencies, dial 911. Now available from your iPhone and Android! Please provide this summary of care documentation to your next provider. Your primary care clinician is listed as Nelida Huff. If you have any questions after today's visit, please call [phone].

## 2017-08-17 NOTE — TELEPHONE ENCOUNTER
Patient wants to speak with nurse regarding a fall. She has appointment on 08/18/2017 with Dr Shanika Blankenship.  Her no is [phone]

## 2017-08-18 ENCOUNTER — TELEPHONE (OUTPATIENT)
Dept: INTERNAL MEDICINE CLINIC | Age: 47
End: 2017-08-18

## 2017-08-18 NOTE — TELEPHONE ENCOUNTER
----- Message from Bijal Singletary MD sent at 8/17/2017  1:57 PM EDT -----  Please let her know that her UA is negative.  Let us know if things do not improve

## 2017-10-16 ENCOUNTER — HOSPITAL ENCOUNTER (OUTPATIENT)
Dept: MAMMOGRAPHY | Age: 47
Discharge: HOME | End: 2017-10-16
Attending: OBSTETRICS & GYNECOLOGY
Payer: COMMERCIAL

## 2017-10-16 DIAGNOSIS — Z12.31 VISIT FOR SCREENING MAMMOGRAM: ICD-10-CM

## 2017-10-16 PROCEDURE — 77063 BREAST TOMOSYNTHESIS BI: CPT

## 2018-01-03 ENCOUNTER — OFFICE VISIT (OUTPATIENT)
Dept: NEUROLOGY | Age: 48
End: 2018-01-03

## 2018-01-03 VITALS — SYSTOLIC BLOOD PRESSURE: 120 MMHG | DIASTOLIC BLOOD PRESSURE: 75 MMHG | OXYGEN SATURATION: 98 % | HEART RATE: 64 BPM

## 2018-01-03 DIAGNOSIS — G43.109 MIGRAINE WITH AURA AND WITHOUT STATUS MIGRAINOSUS, NOT INTRACTABLE: Primary | ICD-10-CM

## 2018-01-03 DIAGNOSIS — R51.9 CHRONIC DAILY HEADACHE: ICD-10-CM

## 2018-01-03 RX ORDER — ELETRIPTAN HYDROBROMIDE 40 MG/1
40 TABLET, FILM COATED ORAL
Qty: 9 TAB | Refills: 5 | Status: SHIPPED | OUTPATIENT
Start: 2018-01-03 | End: 2019-05-08 | Stop reason: SDUPTHER

## 2018-01-03 NOTE — PATIENT INSTRUCTIONS
Learning About Living Simeon Contreras  What is a living will? A living will is a legal form you use to write down the kind of care you want at the end of your life. It is used by the health professionals who will treat you if you aren't able to decide for yourself. If you put your wishes in writing, your loved ones and others will know what kind of care you want. They won't need to guess. This can ease your mind and be helpful to others. A living will is not the same as an estate or property will. An estate will explains what you want to happen with your money and property after you die. Is a living will a legal document? A living will is a legal document. Each state has its own laws about living finn. If you move to another state, make sure that your living will is legal in the state where you now live. Or you might use a universal form that has been approved by many states. This kind of form can sometimes be completed and stored online. Your electronic copy will then be available wherever you have a connection to the Internet. In most cases, doctors will respect your wishes even if you have a form from a different state. · You don't need an  to complete a living will. But legal advice can be helpful if your state's laws are unclear, your health history is complicated, or your family can't agree on what should be in your living will. · You can change your living will at any time. Some people find that their wishes about end-of-life care change as their health changes. · In addition to making a living will, think about completing a medical power of  form. This form lets you name the person you want to make end-of-life treatment decisions for you (your \"health care agent\") if you're not able to. Many hospitals and nursing homes will give you the forms you need to complete a living will and a medical power of .   · Your living will is used only if you can't make or communicate decisions for yourself anymore. If you become able to make decisions again, you can accept or refuse any treatment, no matter what you wrote in your living will. · Your state may offer an online registry. This is a place where you can store your living will online so the doctors and nurses who need to treat you can find it right away. What should you think about when creating a living will? Talk about your end-of-life wishes with your family members and your doctor. Let them know what you want. That way the people making decisions for you won't be surprised by your choices. Think about these questions as you make your living will:  · Do you know enough about life support methods that might be used? If not, talk to your doctor so you know what might be done if you can't breathe on your own, your heart stops, or you're unable to swallow. · What things would you still want to be able to do after you receive life-support methods? Would you want to be able to walk? To speak? To eat on your own? To live without the help of machines? · If you have a choice, where do you want to be cared for? In your home? At a hospital or nursing home? · Do you want certain Hindu practices performed if you become very ill? · If you have a choice at the end of your life, where would you prefer to die? At home? In a hospital or nursing home? Somewhere else? · Would you prefer to be buried or cremated? · Do you want your organs to be donated after you die? What should you do with your living will? · Make sure that your family members and your health care agent have copies of your living will. · Give your doctor a copy of your living will to keep in your medical record. If you have more than one doctor, make sure that each one has a copy. · You may want to put a copy of your living will where it can be easily found. Where can you learn more? Go to http://thea-dee.info/.   Enter X099 in the search box to learn more about \"Learning About Living Anushka. \"  Current as of: September 24, 2016  Content Version: 11.4  © 8643-5123 [employer]. Care instructions adapted under license by [employer] (which disclaims liability or warranty for this information). If you have questions about a medical condition or this instruction, always ask your healthcare professional. [address] any warranty or liability for your use of this information. Advance Directives: Care Instructions  Your Care Instructions  An advance directive is a legal way to state your wishes at the end of your life. It tells your family and your doctor what to do if you can no longer say what you want. There are two main types of advance directives. You can change them any time that your wishes change. · A living will tells your family and your doctor your wishes about life support and other treatment. · A durable power of  for health care lets you name a person to make treatment decisions for you when you can't speak for yourself. This person is called a health care agent. If you do not have an advance directive, decisions about your medical care may be made by a doctor or a  who doesn't know you. It may help to think of an advance directive as a gift to the people who care for you. If you have one, they won't have to make tough decisions by themselves. Follow-up care is a key part of your treatment and safety. Be sure to make and go to all appointments, and call your doctor if you are having problems. It's also a good idea to know your test results and keep a list of the medicines you take. How can you care for yourself at home? · Discuss your wishes with your loved ones and your doctor. This way, there are no surprises. · Many states have a unique form. Or you might use a universal form that has been approved by many states. This kind of form can sometimes be completed and stored online.  Your electronic copy will then be available wherever you have a connection to the Internet. In most cases, doctors will respect your wishes even if you have a form from a different state. · You don't need a  to do an advance directive. But you may want to get legal advice. · Think about these questions when you prepare an advance directive:  ¨ Who do you want to make decisions about your medical care if you are not able to? Many people choose a family member or close friend. ¨ Do you know enough about life support methods that might be used? If not, talk to your doctor so you understand. ¨ What are you most afraid of that might happen? You might be afraid of having pain, losing your independence, or being kept alive by machines. ¨ Where would you prefer to die? Choices include your home, a hospital, or a nursing home. ¨ Would you like to have information about hospice care to support you and your family? ¨ Do you want to donate organs when you die? ¨ Do you want certain Sabianist practices performed before you die? If so, put your wishes in the advance directive. · Read your advance directive every year, and make changes as needed. When should you call for help? Be sure to contact your doctor if you have any questions. Where can you learn more? Go to http://thea-dee.info/. Enter R264 in the search box to learn more about \"Advance Directives: Care Instructions. \"  Current as of: September 24, 2016  Content Version: 11.4  © 8210-1028 [employer]. Care instructions adapted under license by [employer] (which disclaims liability or warranty for this information). If you have questions about a medical condition or this instruction, always ask your healthcare professional. [place] [address] any warranty or liability for your use of this information.   [facility]   Statement to Patients  April 1, 2014      In an effort to ensure the large volume of patient prescription refills is processed in the most efficient and expeditious manner, we are asking our patients to assist us by calling your Pharmacy for all prescription refills, this will include also your  Mail Order Pharmacy. The pharmacy will contact our office electronically to continue the refill process. Please do not wait until the last minute to call your pharmacy. We need at least 48 hours (2days) to fill prescriptions. We also encourage you to call your pharmacy before going to  your prescription to make sure it is ready. With regard to controlled substance prescription refill requests (narcotic refills) that need to be picked up at our office, we ask your cooperation by providing us with at least 72 hours (3days) notice that you will need a refill. We will not refill narcotic prescription refill requests after 4:00pm on any weekday, Monday through Thursday, or after 2:00pm on Fridays, or on the weekends. We encourage everyone to explore another way of getting your prescription refill request processed using [email or web address], our patient web portal through our electronic medical record system. [email or web address] is an efficient and effective way to communicate your medication request directly to the office and  downloadable as an eren on your smart phone . [email or web address] also features a review functionality that allows you to view your medication list as well as leave messages for your physician. Are you ready to get connected? If so please review the attatched instructions or speak to any of our staff to get you set up right away! Thank you so much for your cooperation. Should you have any questions please contact our Practice Administrator. The Physicians and Staff,  [facility]     If we have ordered testing for you, we typically do not call patients with results.  Your doctor or nurse will contact you if there are critical results that need to be addressed before your next appointment. We also schedule follow up appointments so that your results can be discussed in person and any questions you have regarding them may be addressed. Additionally, results may be found by using the My Chart feature and one of our patient service representatives at the  can give you instructions on how to access this feature of our electronic medical record system. Patient Instructions/Plans:   Topiramate (Qudexy XR, Topamax, Topiragen, Trokendi XR) - (By mouth)   Why this medicine is used:   Treats and prevents seizures, and helps prevent migraine headaches. Contact a nurse or doctor right away if you have:  · Feeling agitated, depressed, nervous, or irritable, unusual mood or behavior  · Thoughts of hurting yourself or others  · Confusion, or trouble talking, concentrating, or remembering  · Fever, decreased sweating  · Problems with walking, clumsiness, dizziness, weakness     Common side effects:  · Numbness, tingling, or burning pain in your hands, arms, legs, or feet  · Sleepiness, tiredness  · Loss of appetite, vomiting, weight loss  © 2017 [facility] Information is for End User's use only and may not be sold, redistributed or otherwise used for commercial purposes. Eletriptan (By mouth)   Eletriptan Hydrobromide (el-e-TRIP-tan yuj-loqi-TJOP-mide)  Treats migraine headaches. Brand Name(s): Relpax   There may be other brand names for this medicine. When This Medicine Should Not Be Used: This medicine is not right for everyone. Do not use it if you had an allergic reaction to eletriptan, or if you have certain heart or blood vessel problems. How to Use This Medicine:   Tablet  · Your doctor will tell you how much medicine to use. Do not use more than directed. Use eletriptan only when you have a migraine.   · If your headache comes back or you do not get complete relief, wait at least 2 hours before you take another dose. If you feel you need to take the medicine more than 2 times in one day, call your doctor. · Read and follow the patient instructions that come with this medicine. Talk to your doctor or pharmacist if you have any questions. · Store the medicine in a closed container at room temperature, away from heat, moisture, and direct light. Drugs and Foods to Avoid:   Ask your doctor or pharmacist before using any other medicine, including over-the-counter medicines, vitamins, and herbal products. · Do not use this medicine if you have taken another migraine headache medicine in the past 24 hours, such as another triptan or an ergot medicine. These medicines include almotriptan, dihydroergotamine, ergotamine, frovatriptan, methysergide, rizatriptan, sumatriptan, or zolmitriptan. Do not take eletriptan if you have taken clarithromycin, itraconazole, ketoconazole, nefazodone, nelfinavir, ritonavir, or troleandomycin in the past 72 hours. · Some foods and medicines can affect how eletriptan works. Tell your doctor if you are using medicine for depression or an MAO inhibitor. Warnings While Using This Medicine:   · Tell your doctor if you are pregnant or breastfeeding, or if you have diabetes, high blood pressure, high cholesterol, or if you smoke. Tell your doctor if you have ischemic bowel disease, or a personal or family history of heart disease, heart attack, heart rhythm problems, blood circulation problems, or stroke. · This medicine should be used only for migraine headaches. It will not work for any other kind of headache or pain.   · This medicine may cause the following problems:  ¨ Higher risk for abnormal heart rhythm, heart attack, angina, or stroke  ¨ Spasms in the blood vessels, including Raynaud syndrome  ¨ Serotonin syndrome (more likely if used with medicine to treat depression)  ¨ High blood pressure  · Your headaches may become worse if you use headache medicine for 10 or more days per month.  · This medicine may make you dizzy or drowsy. Do not drive or do anything else that could be dangerous until you know how this medicine affects you. · Call your doctor if your symptoms do not improve or if they get worse. · Keep all medicine out of the reach of children. Never share your medicine with anyone. Possible Side Effects While Using This Medicine:   Call your doctor right away if you notice any of these side effects:  · Allergic reaction: Itching or hives, swelling in your face or hands, swelling or tingling in your mouth or throat, chest tightness, trouble breathing  · Anxiety, restlessness, fever, sweating, muscle spasms, twitching, diarrhea, seeing or hearing things that are not there  · Chest pain (especially if it spreads to your arms, jaw, back, or neck), trouble breathing, unusual sweating, fainting  · Fast or uneven heartbeat  · Numbness or tingling in your hands, arms, legs, or feet, color changes in your fingers or toes  · Numbness or weakness on one side of your body, problems with vision, speech, or walking  · Severe stomach pain, bloody diarrhea, nausea, or vomiting  · Tightness or discomfort in your chest, neck, or jaw  If you notice other side effects that you think are caused by this medicine, tell your doctor. Call your doctor for medical advice about side effects. You may report side effects to FDA at 8-272-FDA-4118  © 2017 [facility] Information is for End User's use only and may not be sold, redistributed or otherwise used for commercial purposes. The above information is an  only. It is not intended as medical advice for individual conditions or treatments. Talk to your doctor, nurse or pharmacist before following any medical regimen to see if it is safe and effective for you.

## 2018-01-03 NOTE — PROGRESS NOTES
NEUROLOGY NEW PATIENT CONSULTATION    REFERRED BY:  Brynn Brown MD    CHIEF COMPLAINT:  Migraines    HISTORY OF PRESENT ILLNESS    HISTORY PROVIDED BY:  Patient    Beth Diaz is a 56 y.o. female who I am asked to see in consultation for migraines. She started with migraines in the sixth grade. Prior to this, and second grade, she had a fracture to her skull, and she questions if this could have been a trigger for her migraines. She does also have a family history of her mother with migraines. Patient has been followed by neurology in the past.  She has previously been on several medications. However, currently she is only taking abortive medications. Patient reports in the past Topamax helped the most but she did have some side effects of cognitive issues with it. She also had hallucinations when she first started it. She went off of it when she started a family. She did well off of the medication for many years until recently. Now, she is having almost daily headaches, and migraines about twice per month. She has always had muscle tension and stress. She tries yoga, massage, hot baths and she can't get it to relax. 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 Her  is a  and is gone 18 days of the month.   He is a commercial   She has had health problems for the past two years. She reports a pain in her right side. She had a precancerous polyp and had to have 4 colonoscopies to get it out. She also had a hysterectomy due to endometriosis. Pain better after this. 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 She had some bleeding in her nipple on the right. She had a thorough eval and had to have biopsy, etc.  She moved here about 1 year ago. She broke her foot prior to moving here. She is gaining weight and can't exercise. She takes baclofen as needed for muscle tightness. She also gets regular massages. She denies any current vision changes or focal numbness or weakness.     PMH  Past Medical History:   Diagnosis Date    Allergic rhinitis, cause unspecified     Calculus of kidney     Calculus of kidney     Colon polyp 11/14/2016    Depression     Headache(784.0)     Hepatic cyst 9/7/2016    HX OTHER MEDICAL     migraines and kidney stones    Migraine        SH  Social History     Social History    Marital status:      Spouse name: Kaia Fragoso ()    Number of children: N/A    Years of education: N/A     Occupational History    Homemaker      Social History Main Topics    Smoking status: Never Smoker    Smokeless tobacco: Never Used    Alcohol use Yes      Comment: 1-2 drinks per week    Drug use: No    Sexual activity: Yes     Partners: Male     Birth control/ protection: None     Other Topics Concern    Not on file     Social History Narrative       FH  Family History   Problem Relation Age of Onset    Thyroid Disease Mother     Cancer Mother      skin - BCCA/squamous    Hypertension Father     Heart Disease Father     Other Father      ankylosing spondylitis    Elevated Lipids Father     Hypertension Paternal Grandmother     Elevated Lipids Paternal Grandmother     Breast Cancer Maternal Aunt 48    Cancer Maternal Aunt      breast    Heart Disease Paternal Grandfather        ALLERGIES  Allergies   Allergen Reactions    Latex Unknown (comments)    Lortab [Hydrocodone-Acetaminophen] Nausea and Vomiting     Nausea and vomiting    Adhesive Tape-Silicones Rash     With extended use of the tape.  Fioricet [Butalbital-Acetaminophen-Caff] Other (comments)     Insomnia. Pt does not recall being allergic to this.  Topamax [Topiramate] Other (comments)     Memory loss - short term memory. CURRENT MEDS  Current Outpatient Prescriptions   Medication Sig Dispense Refill    SERTRALINE HCL (ZOLOFT PO) Take  by mouth.  baclofen (LIORESAL) 10 mg tablet TAKE ONE TABLET BY MOUTH THREE TIMES A DAY 30 Tab 1    rizatriptan (MAXALT-MLT) 10 mg disintegrating tablet Take 1 Tab by mouth once as needed for Migraine for up to 1 dose. 9 Tab 2    cetirizine (ZYRTEC) 10 mg tablet Take 10 mg by mouth daily.  triamcinolone (NASACORT) 55 mcg nasal inhaler 2 Sprays by Both Nostrils route daily.          REVIEW OF SYSTEMS:     Y  N       Y  N  Y  N   Y  N  [] [x] AIDS          [] [x] Falls  [] [x] Memory Loss  [] [x]  Shortness of breath  [x] [] Anxiety          [] [x] Fatigue [x] [] Muscle Pain        [] [x]  Skipped beats  [] [x] Chest Pain   [x] [] Frequent HA [] [x] Ms Weakness     [] [x]  Snoring  [x] [] Constipation [] [x]Hearing loss [x] [] Nause/Vomiting  [] [x]  Stomach Pain  [] [x] Cough          [] [x]Hepatitis [] [x] Neuropathy         [] [x]  Swallowing difficulty  [x] [] Depression  [] [x]Incontinence [] [x] Poor appetite      [] [x]  Vertigo  [] [x] Diarrhea       [] [x] Joint Pain [] [x] Rash                   [x] []  Visual disturbances  [] [x] Fainting        [] [x] Leg Swelling [x] [] Ringing ears       [] [x]  Weight changes      []Unable to obtain  ROS due to  []mental status change  []sedated   []intubated          PREVIOUS WORKUP  IMAGING: No recent neuroimaging      LABS  Results for orders placed or performed in visit on 08/17/17   AMB POC URINALYSIS DIP STICK AUTO W/O MICRO   Result Value Ref Range    Color (UA POC) Yellow     Clarity (UA POC) Clear     Glucose (UA POC) Negative Negative    Bilirubin (UA POC) Negative Negative    Ketones (UA POC) Negative Negative    Specific gravity (UA POC) 1.030 1.001 - 1.035    Blood (UA POC) Negative Negative    pH (UA POC) 5.5 4.6 - 8.0    Protein (UA POC) Negative Negative mg/dL    Urobilinogen (UA POC) 0.2 mg/dL 0.2 - 1    Nitrites (UA POC) Negative Negative    Leukocyte esterase (UA POC) Negative Negative       PHYSICAL EXAM  Visit Vitals    /75    Pulse 64    LMP 11/02/2016    SpO2 98%     General:  Alert, cooperative, no distress. Head:  Normocephalic, without obvious abnormality, atraumatic. Eyes:  Conjunctivae/corneas clear. Pupils equal, round, reactive to light. Extraocular movements intact, VFF, NO papilledema   Lungs:  Heart:   Non labored breathing  Regular rate and rhythm, no carotid bruits   Abdomen:   Soft, non-distended   Extremities: Extremities normal, atraumatic, no cyanosis or edema. Pulses: 2+ and symmetric all extremities. Skin: Skin color, texture, turgor normal. No rashes or lesions.    Neurologic:  Gen: Attention normal             Language: naming, repetition, fluency normal             Memory: intact recent and remote memory  Cranial Nerves:  I: smell Not tested   II: visual fields Full to confrontation   II: pupils Equal, round, reactive to light   II: optic disc No papilledema   III,VII: ptosis none   III,IV,VI: extraocular muscles  Full ROM   V: mastication normal   V: facial light touch sensation  normal   VII: facial muscle function   symmetric   VIII: hearing symmetric   IX: soft palate elevation  normal   XI: trapezius strength  5/5   XI: sternocleidomastoid strength 5/5   XI: neck flexion strength  5/5   XII: tongue  midline     Motor: normal bulk and tone, no tremor              Strength: 5/5 all four extremities  Sensory: intact to LT, PP, vibration, and temperature  Coordination: FTN intact, Rhomberg negative  Gait: normal gait including tandem   Reflexes: 2+ throughout       IMPRESSION  Wilbert Devlin is a 56 y.o. female who presents for evaluation of migraines. Patient also has a chronic daily headache. Patient is neuro exam is nonfocal.  At this time I do not think we need to do neuroimaging. However, I do think she would benefit from a preventative medication. Will initiate this today. We will also try new abortive medication. RECOMMENDATIONS  1. We will hold off any neuroimaging at this time. If patient does not improve with medications will proceed with MRI  2.  Start Trokendi XR 25 mg nightly and titrate to 50 mg nightly. Will stay at this dose given patient's previous history of side effects with generic Topamax  3. Continue Maxalt but will also try Relpax for abortive  4. Discussed trying Cambia samples at follow-up for abortive  5. Migraine book given  6. Encouraged patient to keep migraine diary  7. Encourage patient to continue with massage therapy  8. Also discussed amitriptyline is another option if Topamax does not work. She is previously tried and failed Inderal and does not want to retry this. FU 3 months    Becki Miguel MD    CC: Effie Echeverria MD  Fax: [phone]    This note was created using voice recognition software. Despite editing, there may be syntax errors. This note will not be viewable in [address]

## 2018-01-03 NOTE — MR AVS SNAPSHOT
Visit Information Date & Time Provider Department Dept. Phone Encounter #  
 1/3/2018 10:00 AM Kaleigh Mcfarland MD [facility] [phone] [id number] Upcoming Health Maintenance Date Due  
 PAP AKA CERVICAL CYTOLOGY 5/22/2016 Influenza Age 5 to Adult 8/1/2017 COLONOSCOPY 10/26/2018 DTaP/Tdap/Td series (2 - Td) 1/1/2020 Allergies as of 1/3/2018  Review Complete On: 1/3/2018 By: Kaleigh Mcfarland MD  
  
 Severity Noted Reaction Type Reactions Latex  03/16/2017    Unknown (comments) Lortab [Hydrocodone-acetaminophen] Medium 02/28/2011   Side Effect Nausea and Vomiting Nausea and vomiting Adhesive Tape-silicones  36/43/1939    Rash With extended use of the tape. Fioricet [Butalbital-acetaminophen-caff]  07/22/2013    Other (comments) Insomnia. Pt does not recall being allergic to this. Topamax [Topiramate]  02/28/2011    Other (comments) Memory loss - short term memory. Current Immunizations  Reviewed on 10/10/2011 Name Date Influenza Vaccine (Quad) PF 9/28/2016 10:29 AM  
 Influenza Vaccine PF 9/25/2013 Influenza Vaccine Split 10/10/2011 TDAP Vaccine 1/1/2010 Not reviewed this visit You Were Diagnosed With   
  
 Codes Comments Migraine with aura and without status migrainosus, not intractable    -  Primary ICD-10-CM: G43.109 ICD-9-CM: 346.00 Chronic daily headache     ICD-10-CM: R51 ICD-9-CM: 051. 0 Vitals BP Pulse LMP SpO2 OB Status Smoking Status 120/75 64 11/02/2016 98% Hysterectomy Never Smoker Preferred Pharmacy Pharmacy Name Phone [facility] [address] [phone] Your Updated Medication List  
  
   
This list is accurate as of: 1/3/18 10:55 AM.  Always use your most recent med list.  
  
  
  
  
 baclofen 10 mg tablet Commonly known as:  LIORESAL  
 TAKE ONE TABLET BY MOUTH THREE TIMES A DAY  
  
 eletriptan 40 mg tablet Commonly known as:  RELPAX Take 1 Tab by mouth once as needed for up to 1 dose. Indications: Migraine NASACORT 55 mcg nasal inhaler Generic drug:  triamcinolone 2 Sprays by Both Nostrils route daily. rizatriptan 10 mg disintegrating tablet Commonly known as:  MAXALT-MLT Take 1 Tab by mouth once as needed for Migraine for up to 1 dose. * topiramate ER 25 mg capsule Commonly known as:  TROKENDI XR Take 1 Cap by mouth daily. * topiramate ER 50 mg capsule Commonly known as:  TROKENDI XR Take 1 Cap by mouth daily. * topiramate ER 50 mg capsule Commonly known as:  TROKENDI XR Take 1 Cap by mouth daily. ZOLOFT PO Take  by mouth. ZyrTEC 10 mg tablet Generic drug:  cetirizine Take 10 mg by mouth daily. * Notice: This list has 3 medication(s) that are the same as other medications prescribed for you. Read the directions carefully, and ask your doctor or other care provider to review them with you. Prescriptions Sent to Pharmacy Refills  
 eletriptan (RELPAX) 40 mg tablet 5 Sig: Take 1 Tab by mouth once as needed for up to 1 dose. Indications: Migraine Class: Normal  
 Pharmacy: [place] [id number] Ph #: [phone] Route: Oral  
 topiramate ER (TROKENDI XR) 50 mg capsule 5 Sig: Take 1 Cap by mouth daily. Class: Normal  
 Pharmacy: [address] Ph #: [phone] Route: Oral  
  
Patient Instructions Fany Boyer 17[address] What is a living will? A living will is a legal form you use to write down the kind of care you want at the end of your life. It is used by the health professionals who will treat you if you aren't able to decide for yourself. If you put your wishes in writing, your loved ones and others will know what kind of care you want. They won't need to guess. This can ease your mind and be helpful to others. A living will is not the same as an estate or property will. An estate will explains what you want to happen with your money and property after you die. Is a living will a legal document? A living will is a legal document. Each state has its own laws about living finn. If you move to another state, make sure that your living will is legal in the state where you now live. Or you might use a universal form that has been approved by many states. This kind of form can sometimes be completed and stored online. Your electronic copy will then be available wherever you have a connection to the Internet. In most cases, doctors will respect your wishes even if you have a form from a different state. · You don't need an  to complete a living will. But legal advice can be helpful if your state's laws are unclear, your health history is complicated, or your family can't agree on what should be in your living will. · You can change your living will at any time. Some people find that their wishes about end-of-life care change as their health changes. · In addition to making a living will, think about completing a medical power of  form. This form lets you name the person you want to make end-of-life treatment decisions for you (your \"health care agent\") if you're not able to. Many hospitals and nursing homes will give you the forms you need to complete a living will and a medical power of . · Your living will is used only if you can't make or communicate decisions for yourself anymore. If you become able to make decisions again, you can accept or refuse any treatment, no matter what you wrote in your living will. · Your state may offer an online registry.  This is a place where you can store your living will online so the doctors and nurses who need to treat you can find it right away. What should you think about when creating a living will? Talk about your end-of-life wishes with your family members and your doctor. Let them know what you want. That way the people making decisions for you won't be surprised by your choices. Think about these questions as you make your living will: · Do you know enough about life support methods that might be used? If not, talk to your doctor so you know what might be done if you can't breathe on your own, your heart stops, or you're unable to swallow. · What things would you still want to be able to do after you receive life-support methods? Would you want to be able to walk? To speak? To eat on your own? To live without the help of machines? · If you have a choice, where do you want to be cared for? In your home? At a hospital or nursing home? · Do you want certain Mandaeism practices performed if you become very ill? · If you have a choice at the end of your life, where would you prefer to die? At home? In a hospital or nursing home? Somewhere else? · Would you prefer to be buried or cremated? · Do you want your organs to be donated after you die? What should you do with your living will? · Make sure that your family members and your health care agent have copies of your living will. · Give your doctor a copy of your living will to keep in your medical record. If you have more than one doctor, make sure that each one has a copy. · You may want to put a copy of your living will where it can be easily found. Where can you learn more? Go to http://thea-dee.info/. Enter O735 in the search box to learn more about \"Learning About Living Perrosaurabh. \" Current as of: September 24, 2016 Content Version: 11.4 © 5340-0106 Healthwise, Incorporated.  Care instructions adapted under license by [address] (which disclaims liability or warranty for this information). If you have questions about a medical condition or this instruction, always ask your healthcare professional. [address] any warranty or liability for your use of this information. Advance Directives: Care Instructions Your Care Instructions An advance directive is a legal way to state your wishes at the end of your life. It tells your family and your doctor what to do if you can no longer say what you want. There are two main types of advance directives. You can change them any time that your wishes change. · A living will tells your family and your doctor your wishes about life support and other treatment. · A durable power of  for health care lets you name a person to make treatment decisions for you when you can't speak for yourself. This person is called a health care agent. If you do not have an advance directive, decisions about your medical care may be made by a doctor or a  who doesn't know you. It may help to think of an advance directive as a gift to the people who care for you. If you have one, they won't have to make tough decisions by themselves. Follow-up care is a key part of your treatment and safety. Be sure to make and go to all appointments, and call your doctor if you are having problems. It's also a good idea to know your test results and keep a list of the medicines you take. How can you care for yourself at home? · Discuss your wishes with your loved ones and your doctor. This way, there are no surprises. · Many states have a unique form. Or you might use a universal form that has been approved by many states. This kind of form can sometimes be completed and stored online. Your electronic copy will then be available wherever you have a connection to the Internet.  In most cases, doctors will respect your wishes even if you have a form from a different state. · You don't need a  to do an advance directive. But you may want to get legal advice. · Think about these questions when you prepare an advance directive: ¨ Who do you want to make decisions about your medical care if you are not able to? Many people choose a family member or close friend. ¨ Do you know enough about life support methods that might be used? If not, talk to your doctor so you understand. ¨ What are you most afraid of that might happen? You might be afraid of having pain, losing your independence, or being kept alive by machines. ¨ Where would you prefer to die? Choices include your home, a hospital, or a nursing home. ¨ Would you like to have information about hospice care to support you and your family? ¨ Do you want to donate organs when you die? ¨ Do you want certain Anabaptist practices performed before you die? If so, put your wishes in the advance directive. · Read your advance directive every year, and make changes as needed. When should you call for help? Be sure to contact your doctor if you have any questions. Where can you learn more? Go to http://thea-dee.info/. Enter R264 in the search box to learn more about \"Advance Directives: Care Instructions. \" Current as of: September 24, 2016 Content Version: 11.4 © 1486-0525 Healthwise, Incorporated. Care instructions adapted under license by [employer] (which disclaims liability or warranty for this information). If you have questions about a medical condition or this instruction, always ask your healthcare professional. [place] [address] any warranty or liability for your use of this information. PRESCRIPTION REFILL POLICY [facility] Statement to Patients April 1, 2014 In an effort to ensure the large volume of patient prescription refills is processed in the most efficient and expeditious manner, we are asking our patients to assist us by calling your Pharmacy for all prescription refills, this will include also your  Mail Order Pharmacy. The pharmacy will contact our office electronically to continue the refill process. Please do not wait until the last minute to call your pharmacy. We need at least 48 hours (2days) to fill prescriptions. We also encourage you to call your pharmacy before going to  your prescription to make sure it is ready. With regard to controlled substance prescription refill requests (narcotic refills) that need to be picked up at our office, we ask your cooperation by providing us with at least 72 hours (3days) notice that you will need a refill. We will not refill narcotic prescription refill requests after 4:00pm on any weekday, Monday through Thursday, or after 2:00pm on Fridays, or on the weekends. We encourage everyone to explore another way of getting your prescription refill request processed using [employer], our patient web portal through our electronic medical record system. [employer] is an efficient and effective way to communicate your medication request directly to the office and  downloadable as an eren on your smart phone . [employer] also features a review functionality that allows you to view your medication list as well as leave messages for your physician. Are you ready to get connected? If so please review the attatched instructions or speak to any of our staff to get you set up right away! Thank you so much for your cooperation. Should you have any questions please contact our Practice Administrator. The Physicians and Staff,  [facility] If we have ordered testing for you, we typically do not call patients with results. Your doctor or nurse will contact you if there are critical results that need to be addressed before your next appointment.   We also schedule follow up appointments so that your results can be discussed in person and any questions you have regarding them may be addressed. Additionally, results may be found by using the My Chart feature and one of our patient service representatives at the  can give you instructions on how to access this feature of our electronic medical record system. Patient Instructions/Plans: 
 Topiramate (Qudexy XR, Topamax, Topiragen, Trokendi XR) - (By mouth) Why this medicine is used:  
Treats and prevents seizures, and helps prevent migraine headaches. Contact a nurse or doctor right away if you have: · Feeling agitated, depressed, nervous, or irritable, unusual mood or behavior · Thoughts of hurting yourself or others · Confusion, or trouble talking, concentrating, or remembering · Fever, decreased sweating · Problems with walking, clumsiness, dizziness, weakness Common side effects: 
· Numbness, tingling, or burning pain in your hands, arms, legs, or feet · Sleepiness, tiredness · Loss of appetite, vomiting, weight loss © 2017 [facility] Information is for End User's use only and may not be sold, redistributed or otherwise used for commercial purposes. Eletriptan (By mouth) Eletriptan Hydrobromide (el-e-TRIP-tan ctv-vwjm-VTXB-mide) Treats migraine headaches. Brand Name(s): Relpax There may be other brand names for this medicine. When This Medicine Should Not Be Used: This medicine is not right for everyone. Do not use it if you had an allergic reaction to eletriptan, or if you have certain heart or blood vessel problems. How to Use This Medicine:  
Tablet · Your doctor will tell you how much medicine to use. Do not use more than directed. Use eletriptan only when you have a migraine. · If your headache comes back or you do not get complete relief, wait at least 2 hours before you take another dose.  If you feel you need to take the medicine more than 2 times in one day, call your doctor. · Read and follow the patient instructions that come with this medicine. Talk to your doctor or pharmacist if you have any questions. · Store the medicine in a closed container at room temperature, away from heat, moisture, and direct light. Drugs and Foods to Avoid: Ask your doctor or pharmacist before using any other medicine, including over-the-counter medicines, vitamins, and herbal products. · Do not use this medicine if you have taken another migraine headache medicine in the past 24 hours, such as another triptan or an ergot medicine. These medicines include almotriptan, dihydroergotamine, ergotamine, frovatriptan, methysergide, rizatriptan, sumatriptan, or zolmitriptan. Do not take eletriptan if you have taken clarithromycin, itraconazole, ketoconazole, nefazodone, nelfinavir, ritonavir, or troleandomycin in the past 72 hours. · Some foods and medicines can affect how eletriptan works. Tell your doctor if you are using medicine for depression or an MAO inhibitor. Warnings While Using This Medicine: · Tell your doctor if you are pregnant or breastfeeding, or if you have diabetes, high blood pressure, high cholesterol, or if you smoke. Tell your doctor if you have ischemic bowel disease, or a personal or family history of heart disease, heart attack, heart rhythm problems, blood circulation problems, or stroke. · This medicine should be used only for migraine headaches. It will not work for any other kind of headache or pain. · This medicine may cause the following problems: 
¨ Higher risk for abnormal heart rhythm, heart attack, angina, or stroke ¨ Spasms in the blood vessels, including Raynaud syndrome ¨ Serotonin syndrome (more likely if used with medicine to treat depression) ¨ High blood pressure · Your headaches may become worse if you use headache medicine for 10 or more days per month. · This medicine may make you dizzy or drowsy. Do not drive or do anything else that could be dangerous until you know how this medicine affects you. · Call your doctor if your symptoms do not improve or if they get worse. · Keep all medicine out of the reach of children. Never share your medicine with anyone. Possible Side Effects While Using This Medicine:  
Call your doctor right away if you notice any of these side effects: · Allergic reaction: Itching or hives, swelling in your face or hands, swelling or tingling in your mouth or throat, chest tightness, trouble breathing · Anxiety, restlessness, fever, sweating, muscle spasms, twitching, diarrhea, seeing or hearing things that are not there · Chest pain (especially if it spreads to your arms, jaw, back, or neck), trouble breathing, unusual sweating, fainting · Fast or uneven heartbeat · Numbness or tingling in your hands, arms, legs, or feet, color changes in your fingers or toes · Numbness or weakness on one side of your body, problems with vision, speech, or walking · Severe stomach pain, bloody diarrhea, nausea, or vomiting · Tightness or discomfort in your chest, neck, or jaw If you notice other side effects that you think are caused by this medicine, tell your doctor. Call your doctor for medical advice about side effects. You may report side effects to FDA at 1-314-FDA-9726 © 2017 [address] Information is for End User's use only and may not be sold, redistributed or otherwise used for commercial purposes. The above information is an  only. It is not intended as medical advice for individual conditions or treatments. Talk to your doctor, nurse or pharmacist before following any medical regimen to see if it is safe and effective for you. Introducing [facility] & HEALTH SERVICES! Dear Ailyn: Thank you for requesting a [employer] account.   Our records indicate that you already have an active [employer] account. You can access your account anytime at [email or web address] Did you know that you can access your hospital and ER discharge instructions at any time in [employer]? You can also review all of your test results from your hospital stay or ER visit. Additional Information If you have questions, please visit the Frequently Asked Questions section of the [employer] website at [email or web address] Remember, [employer] is NOT to be used for urgent needs. For medical emergencies, dial 911. Now available from your iPhone and Android! Please provide this summary of care documentation to your next provider. Your primary care clinician is listed as Dayton Abel. If you have any questions after today's visit, please call [phone].

## 2018-01-03 NOTE — LETTER
Dear Guanaco Nassar MD, Thank you for allowing me to see your patient, Daniel Hunter for a neurological consultation. Please see my impression and recommendations as outlined in my note. Sincerely, Jordi Johnson MD 
[facility] at [address] [facility] BY: 
Guanaco Nassar MD 
 
CHIEF COMPLAINT: 
Migraines HISTORY OF PRESENT ILLNESS HISTORY PROVIDED BY: 
Patient Daniel Hunter is a 56 y.o. female who I am asked to see in consultation for migraines. She started with migraines in the sixth grade. Prior to this, and second grade, she had a fracture to her skull, and she questions if this could have been a trigger for her migraines. She does also have a family history of her mother with migraines. Patient has been followed by neurology in the past.  She has previously been on several medications. However, currently she is only taking abortive medications. Patient reports in the past Topamax helped the most but she did have some side effects of cognitive issues with it. She also had hallucinations when she first started it. She went off of it when she started a family. She did well off of the medication for many years until recently. Now, she is having almost daily headaches, and migraines about twice per month. She has always had muscle tension and stress. She tries yoga, massage, hot baths and she can't get it to relax. Headache Characterization: throbbing/aching/pounding Pain Level: 10/10 Aura: Lights in her vision Frequency/Length: Daily Location: left side but both temples feel bruised Nausea/Vomiting: Y Photophobia: Y Phonophobia: Y Provoking factors: Stress Relieving factors: Medication Focal neurologic symptoms with headache: No 
 
Meds: 
Prior abortive tx: Imitrex and Fioricet Prior preventative tx: Corgard, Inderal, Topamax, Zonegran, Depakote, DA 2 shots, Reglan Current abortive tx: Maxalt Current preventative tx: Zoloft Family Hx of headaches/migraines: mom Social: 
Work: She started a boutique to sell tops/tunics/jewelry (this helps her relieve some stress) She has two boys at 8 and 6. Her  is a  and is gone 18 days of the month. He is a  She has had health problems for the past two years. She reports a pain in her right side. She had a precancerous polyp and had to have 4 colonoscopies to get it out. She also had a hysterectomy due to endometriosis. Pain better after this. 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 She had some bleeding in her nipple on the right. She had a thorough eval and had to have biopsy, etc. 
She moved here about 1 year ago. She broke her foot prior to moving here. She is gaining weight and can't exercise. She takes baclofen as needed for muscle tightness. She also gets regular massages. She denies any current vision changes or focal numbness or weakness. [facility] Past Medical History:  
Diagnosis Date  Allergic rhinitis, cause unspecified  Calculus of kidney  Calculus of kidney  Colon polyp 11/14/2016  Depression  Headache(784.0)  Hepatic cyst 9/7/2016  HX OTHER MEDICAL   
 migraines and kidney stones  Migraine [address] [facility] Social History Social History  Marital status:  Spouse name: Soni Khan ()  Number of children: N/A  
 Years of education: N/A Occupational History  Homemaker Social History Main Topics  Smoking status: Never Smoker  Smokeless tobacco: Never Used  Alcohol use Yes Comment: 1-2 drinks per week  Drug use: No  
 Sexual activity: Yes  
  Partners: Male Birth control/ protection: None Other Topics Concern  Not on file Social History Narrative [facility] Family History Problem Relation Age of Onset  Thyroid Disease Mother  Cancer Mother skin - BCCA/squamous  Hypertension Father  Heart Disease Father  Other Father   
  ankylosing spondylitis  Elevated Lipids Father  Hypertension Paternal Grandmother  Elevated Lipids Paternal Grandmother  Breast Cancer Maternal Aunt 50  Cancer Maternal Aunt   
  breast  
 Heart Disease Paternal Grandfather ALLERGIES Allergies Allergen Reactions  Latex Unknown (comments)  Lortab [Hydrocodone-Acetaminophen] Nausea and Vomiting Nausea and vomiting  Adhesive Tape-Silicones Rash With extended use of the tape.  Fioricet [Butalbital-Acetaminophen-Caff] Other (comments) Insomnia. Pt does not recall being allergic to this.  Topamax [Topiramate] Other (comments) Memory loss - short term memory. CURRENT MEDS Current Outpatient Prescriptions Medication Sig Dispense Refill  SERTRALINE HCL (ZOLOFT PO) Take  by mouth.  baclofen (LIORESAL) 10 mg tablet TAKE ONE TABLET BY MOUTH THREE TIMES A DAY 30 Tab 1  rizatriptan (MAXALT-MLT) 10 mg disintegrating tablet Take 1 Tab by mouth once as needed for Migraine for up to 1 dose. 9 Tab 2  cetirizine (ZYRTEC) 10 mg tablet Take 10 mg by mouth daily.  triamcinolone (NASACORT) 55 mcg nasal inhaler 2 Sprays by Both Nostrils route daily. REVIEW OF SYSTEMS:  
 
Y  N       Y  N  Y  N   Y  N 
  AIDS            Falls    Memory Loss     Shortness of breath Anxiety            Fatigue   Muscle Pain           Skipped beats Chest Pain     Frequent HA   Ms Weakness        Snoring Constipation  Hearing loss   Nause/Vomiting     Stomach Pain Cough           Hepatitis   Neuropathy            Swallowing difficulty Depression   Incontinence   Poor appetite         Vertigo Diarrhea         Joint Pain   Rash                      Visual disturbances Fainting          Leg Swelling   Ringing ears          Weight changes Unable to obtain  ROS due to  mental status change  sedated   intubated PREVIOUS WORKUP IMAGING: No recent neuroimaging LABS Results for orders placed or performed in visit on 08/17/17 AMB POC URINALYSIS DIP STICK AUTO W/O MICRO Result Value Ref Range Color (UA POC) Yellow Clarity (UA POC) Clear Glucose (UA POC) Negative Negative Bilirubin (UA POC) Negative Negative Ketones (UA POC) Negative Negative Specific gravity (UA POC) 1.030 1.001 - 1.035 Blood (UA POC) Negative Negative pH (UA POC) 5.5 4.6 - 8.0 Protein (UA POC) Negative Negative mg/dL Urobilinogen (UA POC) 0.2 mg/dL 0.2 - 1 Nitrites (UA POC) Negative Negative Leukocyte esterase (UA POC) Negative Negative PHYSICAL EXAM 
Visit Vitals  /75  Pulse 64  LMP 11/02/2016  SpO2 98% General:  Alert, cooperative, no distress. Head:  Normocephalic, without obvious abnormality, atraumatic. Eyes:  Conjunctivae/corneas clear. Pupils equal, round, reactive to light. Extraocular movements intact, VFF, NO papilledema Lungs: 
Heart:   Non labored breathing Regular rate and rhythm, no carotid bruits Abdomen:   Soft, non-distended Extremities: Extremities normal, atraumatic, no cyanosis or edema. Pulses: 2+ and symmetric all extremities. Skin: Skin color, texture, turgor normal. No rashes or lesions. Neurologic:  Gen: Attention normal 
           Language: naming, repetition, fluency normal 
           Memory: intact recent and remote memory Cranial Nerves: 
I: smell Not tested II: visual fields Full to confrontation II: pupils Equal, round, reactive to light II: optic disc No papilledema III,VII: ptosis none III,IV,VI: extraocular muscles  Full ROM V: mastication normal  
V: facial light touch sensation  normal  
VII: facial muscle function   symmetric VIII: hearing symmetric IX: soft palate elevation  normal  
XI: trapezius strength  5/5  
 XI: sternocleidomastoid strength 5/5 XI: neck flexion strength  5/5 XII: tongue  midline Motor: normal bulk and tone, no tremor Strength: 5/5 all four extremities Sensory: intact to LT, PP, vibration, and temperature Coordination: FTN intact, Rhomberg negative Gait: normal gait including tandem Reflexes: 2+ throughout IMPRESSION Gurwinder Dawkins is a 56 y.o. female who presents for evaluation of migraines. Patient also has a chronic daily headache. Patient is neuro exam is nonfocal.  At this time I do not think we need to do neuroimaging. However, I do think she would benefit from a preventative medication. Will initiate this today. We will also try new abortive medication. RECOMMENDATIONS 1. We will hold off any neuroimaging at this time. If patient does not improve with medications will proceed with MRI 2.  Start Trokendi XR 25 mg nightly and titrate to 50 mg nightly. Will stay at this dose given patient's previous history of side effects with generic Topamax 3. Continue Maxalt but will also try Relpax for abortive 4. Discussed trying Cambia samples at follow-up for abortive 5. Migraine book given 6. Encouraged patient to keep migraine diary 7. Encourage patient to continue with massage therapy 8. Also discussed amitriptyline is another option if Topamax does not work. She is previously tried and failed Inderal and does not want to retry this. FU 3 months Maria G Quiñonez MD 
 
CC: Kandi Iyer MD 
Fax: [phone] This note was created using voice recognition software. Despite editing, there may be syntax errors. This note will not be viewable in [address]

## 2018-01-29 ENCOUNTER — HOSPITAL ENCOUNTER (OUTPATIENT)
Dept: GENERAL RADIOLOGY | Age: 48
Discharge: HOME | End: 2018-01-29
Attending: INTERNAL MEDICINE
Payer: COMMERCIAL

## 2018-01-29 ENCOUNTER — TELEPHONE (OUTPATIENT)
Dept: NEUROLOGY | Age: 48
End: 2018-01-29

## 2018-01-29 DIAGNOSIS — M46.90 INFLAMMATORY SPONDYLOPATHY (HCC): ICD-10-CM

## 2018-01-29 PROCEDURE — 72202 X-RAY EXAM SI JOINTS 3/> VWS: CPT

## 2018-01-29 NOTE — TELEPHONE ENCOUNTER
----- Message from Sana Cooley sent at 1/29/2018 12:38 PM EST -----  Regarding: Dr. Karthik Bunn  The patient is requesting a call back to discuss coming off of Rx Topamax.  (d)[phone]

## 2018-03-13 RX ORDER — SERTRALINE HYDROCHLORIDE 100 MG/1
TABLET, FILM COATED ORAL
Qty: 30 TAB | Refills: 4 | Status: SHIPPED | OUTPATIENT
Start: 2018-03-13 | End: 2018-09-10 | Stop reason: SDUPTHER

## 2018-03-16 ENCOUNTER — OFFICE VISIT (OUTPATIENT)
Dept: INTERNAL MEDICINE CLINIC | Age: 48
End: 2018-03-16

## 2018-03-16 VITALS
WEIGHT: 179 LBS | HEIGHT: 68 IN | HEART RATE: 69 BPM | DIASTOLIC BLOOD PRESSURE: 66 MMHG | SYSTOLIC BLOOD PRESSURE: 102 MMHG | RESPIRATION RATE: 16 BRPM | OXYGEN SATURATION: 97 % | BODY MASS INDEX: 27.13 KG/M2 | TEMPERATURE: 99 F

## 2018-03-16 DIAGNOSIS — R59.0 LAD (LYMPHADENOPATHY), PREAURICULAR: Primary | ICD-10-CM

## 2018-03-16 DIAGNOSIS — R63.5 WEIGHT GAIN: ICD-10-CM

## 2018-03-16 RX ORDER — GUAIFENESIN 600 MG/1
600 TABLET, EXTENDED RELEASE ORAL 2 TIMES DAILY
COMMUNITY
End: 2019-05-08

## 2018-03-16 NOTE — PROGRESS NOTES
Fortunato Mcclain is a 56 y.o. female who presents today for Mass  . She has a history of   Patient Active Problem List   Diagnosis Code    Allergic rhinitis, cause unspecified J30.9    Calculus of kidney N20.0    Hepatic cyst K76.89    Splenic cyst D73.4    Migraine without aura and without status migrainosus, not intractable G43.009    Colon polyp K63.5    Right upper quadrant abdominal pain R10.11   . Today patient is here for an acute visit. Problem visit:  Fortunato Mcclain is here for complaint of mass to L temporal area and H/A. Asna Tavo Problem began 1 day(s) ago. Severity is moderate  Character of problem: Small new mass. Pain makes the problem worse. Associated symptoms include: no congestion, Some pressure behind L eye. ROS  Review of Systems   Constitutional: Negative for chills, fever, malaise/fatigue and weight loss. HENT: Positive for sinus pain (Pain behind L eye). Negative for congestion, ear discharge, ear pain, hearing loss, nosebleeds and sore throat. Small swollen lymph node. Eyes: Negative for blurred vision, double vision and photophobia. Respiratory: Negative for cough, hemoptysis, sputum production and shortness of breath. Cardiovascular: Negative for chest pain, palpitations, orthopnea and claudication. Neurological: Positive for headaches. Negative for dizziness, tingling, tremors, sensory change and speech change. Visit Vitals    /66 (BP 1 Location: Left arm, BP Patient Position: Sitting)    Pulse 69    Temp 99 °F (37.2 °C) (Oral)    Resp 16    Ht 5' 8\" (1.727 m)    Wt 179 lb (81.2 kg)    SpO2 97%    BMI 27.22 kg/m2       Physical Exam   Constitutional: She is oriented to person, place, and time. She appears well-developed and well-nourished. HENT:   Head: Normocephalic and atraumatic. Small firm mobile mass. Consistent with small LN vs lipoma.  Not attached to TMJ   Eyes: EOM are normal. Pupils are equal, round, and reactive to light. Cardiovascular:   No murmur heard. Pulmonary/Chest: Effort normal. No respiratory distress. Neurological: She is alert and oriented to person, place, and time. Skin: Skin is warm and dry. Psychiatric: She has a normal mood and affect. Her behavior is normal.         Current Outpatient Prescriptions   Medication Sig    guaiFENesin ER (MUCINEX) 600 mg ER tablet Take 600 mg by mouth two (2) times a day.  sertraline (ZOLOFT) 100 mg tablet TAKE ONE TABLET BY MOUTH DAILY    eletriptan (RELPAX) 40 mg tablet Take 1 Tab by mouth once as needed for up to 1 dose. Indications: Migraine    baclofen (LIORESAL) 10 mg tablet TAKE ONE TABLET BY MOUTH THREE TIMES A DAY    rizatriptan (MAXALT-MLT) 10 mg disintegrating tablet Take 1 Tab by mouth once as needed for Migraine for up to 1 dose.  triamcinolone (NASACORT) 55 mcg nasal inhaler 2 Sprays by Both Nostrils route daily.  cetirizine (ZYRTEC) 10 mg tablet Take 10 mg by mouth daily.  topiramate ER (TROKENDI XR) 25 mg capsule Take 1 Cap by mouth daily.  topiramate ER (TROKENDI XR) 25 mg capsule Take 1 Cap by mouth daily.  topiramate ER (TROKENDI XR) 50 mg capsule Take 1 Cap by mouth daily.  topiramate ER (TROKENDI XR) 50 mg capsule Take 1 Cap by mouth daily. No current facility-administered medications for this visit.          Past Medical History:   Diagnosis Date    Allergic rhinitis, cause unspecified     Calculus of kidney     Calculus of kidney     Colon polyp 11/14/2016    Depression     Headache(784.0)     Hepatic cyst 9/7/2016    HX OTHER MEDICAL     migraines and kidney stones    Migraine       Past Surgical History:   Procedure Laterality Date    COLONOSCOPY N/A 11/14/2016    COLONOSCOPY WITH ENDOSCOPIC SUBMUCOSAL DISSECTION (ESD) performed by Sammy Feliciano MD at [facility] 84 Right 2016    neg;ductogram    HX BREAST BIOPSY Right 2016    Neg; 2 MRI after ductogram    HX BREAST BIOPSY Left 2016    neg; mri bx    HX COLONOSCOPY  2016    HX GYN      novasure procedure    HX HEENT      tonsils and adenoids - once as of 11/11/2016    HX HYSTERECTOMY  2016    HX OTHER SURGICAL      broken right arm x2 - no surgery    HX TONSIL AND ADENOIDECTOMY  1978      Social History   Substance Use Topics    Smoking status: Never Smoker    Smokeless tobacco: Never Used    Alcohol use Yes      Comment: 1-2 drinks per week      Family History   Problem Relation Age of Onset    Thyroid Disease Mother     Cancer Mother      skin - BCCA/squamous    Hypertension Father     Heart Disease Father     Other Father      ankylosing spondylitis    Elevated Lipids Father     Hypertension Paternal Grandmother     Elevated Lipids Paternal Grandmother     Breast Cancer Maternal Aunt 48    Cancer Maternal Aunt      breast    Heart Disease Paternal Grandfather         Allergies   Allergen Reactions    Latex Unknown (comments)    Lortab [Hydrocodone-Acetaminophen] Nausea and Vomiting     Nausea and vomiting    Adhesive Tape-Silicones Rash     With extended use of the tape.  Fioricet [Butalbital-Acetaminophen-Caff] Other (comments)     Insomnia. Pt does not recall being allergic to this.  Topamax [Topiramate] Other (comments)     Memory loss - short term memory. Assessment/Plan  Diagnoses and all orders for this visit:    1. LAD (lymphadenopathy), preauricular - likely reactive. Monitor. Blood work if not resolving. 2. Weight gain - mild. Notes that she is dieting and exercising. Suggest continuing this. Would not recommend meds at this time. Follow-up Disposition:  Return in about 3 months (around 6/16/2018).     Mike Jj MD  3/16/2018

## 2018-03-16 NOTE — MR AVS SNAPSHOT
[address] [facility] [place], [facility] 
 
 
 [address] [facility] 
[phone] Patient: Michael Hogan MRN:  [id number] Visit Information Date & Time Provider Department Dept. Phone Encounter #  
 3/16/2018  1:00 PM Yeimi Ibrahim MD [facility] [address] [facility] [id number] Your Appointments 4/13/2018  2:40 PM  
Follow Up with Isabelle Suresh MD  
[facility]) Appt Note: Headache [place] [address] [place] [address] [phone]  
  
   
 [place] [address] [id number] [address] Upcoming Health Maintenance Date Due  
 PAP AKA CERVICAL CYTOLOGY 5/22/2016 Influenza Age 5 to Adult 8/1/2017 COLONOSCOPY 10/26/2018 DTaP/Tdap/Td series (2 - Td) 1/1/2020 Allergies as of 3/16/2018  Review Complete On: 3/80/9009 By: Han Swift Severity Noted Reaction Type Reactions Latex  03/16/2017    Unknown (comments) Lortab [Hydrocodone-acetaminophen] Medium 02/28/2011   Side Effect Nausea and Vomiting Nausea and vomiting Adhesive Tape-silicones  84/22/2706    Rash With extended use of the tape. Fioricet [Butalbital-acetaminophen-caff]  07/22/2013    Other (comments) Insomnia. Pt does not recall being allergic to this. Topamax [Topiramate]  02/28/2011    Other (comments) Memory loss - short term memory. Current Immunizations  Reviewed on 10/10/2011 Name Date Influenza Vaccine (Quad) PF 9/28/2016 10:29 AM  
 Influenza Vaccine PF 9/25/2013 Influenza Vaccine Split 10/10/2011 TDAP Vaccine 1/1/2010 Not reviewed this visit You Were Diagnosed With   
  
 Codes Comments LAD (lymphadenopathy), preauricular    -  Primary ICD-10-CM: R59.0 ICD-9-CM: 905. 6 Weight gain     ICD-10-CM: R63.5 ICD-9-CM: 783.1 Vitals BP Pulse Temp Resp Height(growth percentile) Weight(growth percentile) 102/66 (BP 1 Location: Left arm, BP Patient Position: Sitting) 69 99 °F (37.2 °C) (Oral) 16 5' 8\" (1.727 m) 179 lb (81.2 kg) LMP SpO2 BMI OB Status Smoking Status 11/02/2016 97% 27.22 kg/m2 Hysterectomy Never Smoker Vitals History BMI and BSA Data Body Mass Index Body Surface Area  
 27.22 kg/m 2 1.97 m 2 Preferred Pharmacy Pharmacy Name Phone [facility] [address], [address] [phone] Your Updated Medication List  
  
   
This list is accurate as of 3/16/18  1:45 PM.  Always use your most recent med list.  
  
  
  
  
 baclofen 10 mg tablet Commonly known as:  LIORESAL  
TAKE ONE TABLET BY MOUTH THREE TIMES A DAY  
  
 eletriptan 40 mg tablet Commonly known as:  RELPAX Take 1 Tab by mouth once as needed for up to 1 dose. Indications: Migraine MUCINEX 600 mg ER tablet Generic drug:  guaiFENesin ER Take 600 mg by mouth two (2) times a day. NASACORT 55 mcg nasal inhaler Generic drug:  triamcinolone 2 Sprays by Both Nostrils route daily. rizatriptan 10 mg disintegrating tablet Commonly known as:  MAXALT-MLT Take 1 Tab by mouth once as needed for Migraine for up to 1 dose. sertraline 100 mg tablet Commonly known as:  ZOLOFT  
TAKE ONE TABLET BY MOUTH DAILY * topiramate ER 25 mg capsule Commonly known as:  TROKENDI XR Take 1 Cap by mouth daily. * topiramate ER 50 mg capsule Commonly known as:  TROKENDI XR Take 1 Cap by mouth daily. * topiramate ER 50 mg capsule Commonly known as:  TROKENDI XR Take 1 Cap by mouth daily. * topiramate ER 25 mg capsule Commonly known as:  TROKENDI XR Take 1 Cap by mouth daily. ZyrTEC 10 mg tablet Generic drug:  cetirizine Take 10 mg by mouth daily. * Notice: This list has 4 medication(s) that are the same as other medications prescribed for you. Read the directions carefully, and ask your doctor or other care provider to review them with you. Patient Instructions Swollen Lymph Nodes: Care Instructions Your Care Instructions Lymph nodes are small, bean-shaped glands throughout the body. They help your body fight germs and infections. Lymph nodes often swell when there is a problem such as an injury, infection, or tumor. · The nodes in your neck, under your chin, or behind your ears may swell when you have a cold or sore throat. · An injury or infection in a leg or foot can make the nodes in your groin swell. · Sometimes medicine can make lymph nodes swell, but this is rare. Treatment depends on what caused your nodes to swell. Usually the nodes return to normal size without a problem. Follow-up care is a key part of your treatment and safety. Be sure to make and go to all appointments, and call your doctor if you are having problems. It's also a good idea to know your test results and keep a list of the medicines you take. How can you care for yourself at home? · Take your medicines exactly as prescribed. Call your doctor if you think you are having a problem with your medicine. · Avoid irritation. ¨ Do not squeeze or pick at the lump. ¨ Do not stick a needle in it. · Prevent infection. Do not squeeze, drain, or puncture a painful lump. Doing this can irritate or inflame the lump, push any existing infection deeper into the skin, or cause severe bleeding. · Get extra rest. Slow down just a little from your usual routine. · Drink plenty of fluids, enough so that your urine is light yellow or clear like water. If you have kidney, heart, or liver disease and have to limit fluids, talk with your doctor before you increase the amount of fluids you drink. · Take an over-the-counter pain medicine, such as acetaminophen (Tylenol), ibuprofen (Advil, Motrin), or naproxen (Aleve). Read and follow all instructions on the label. · Do not take two or more pain medicines at the same time unless the doctor told you to. Many pain medicines have acetaminophen, which is Tylenol. Too much acetaminophen (Tylenol) can be harmful. When should you call for help? Call your doctor now or seek immediate medical care if: 
? · You have worse symptoms of infection, such as: 
¨ Increased pain, swelling, warmth, or redness. ¨ Red streaks leading from the area. ¨ Pus draining from the area. ¨ A fever. ? Watch closely for changes in your health, and be sure to contact your doctor if: 
? · Your lymph nodes do not get smaller or do not return to normal.  
? · You do not get better as expected. Where can you learn more? Go to http://thea-dee.info/. Enter F006 in the search box to learn more about \"Swollen Lymph Nodes: Care Instructions. \" Current as of: March 3, 2017 Content Version: 11.4 © 1876-0839 [employer] by [employer]. Care instructions adapted under license by [employer] (which disclaims liability or warranty for this information). If you have questions about a medical condition or this instruction, always ask your healthcare professional. [address] any warranty or liability for your use of this information. Introducing [facility]! Dear Sydni Soler: Thank you for requesting a [employer]end[employer] account. Our records indicate that you already have an active Lendio account. You can access your account anytime at [email or web address] Did you know that you can access your hospital and ER discharge instructions at any time in [employer]endio? You can also review all of your test results from your hospital stay or ER visit. Additional Information If you have questions, please visit the Frequently Asked Questions section of the [email or web address] website at [email or web address] Remember, [employer] is NOT to be used for urgent needs. For medical emergencies, dial 911. Now available from your iPhone and Android! Please provide this summary of care documentation to your next provider. Your primary care clinician is listed as Guanaco Nassar. If you have any questions after today's visit, please call [phone].

## 2018-03-16 NOTE — PATIENT INSTRUCTIONS
Swollen Lymph Nodes: Care Instructions  Your Care Instructions    Lymph nodes are small, bean-shaped glands throughout the body. They help your body fight germs and infections. Lymph nodes often swell when there is a problem such as an injury, infection, or tumor. · The nodes in your neck, under your chin, or behind your ears may swell when you have a cold or sore throat. · An injury or infection in a leg or foot can make the nodes in your groin swell. · Sometimes medicine can make lymph nodes swell, but this is rare. Treatment depends on what caused your nodes to swell. Usually the nodes return to normal size without a problem. Follow-up care is a key part of your treatment and safety. Be sure to make and go to all appointments, and call your doctor if you are having problems. It's also a good idea to know your test results and keep a list of the medicines you take. How can you care for yourself at home? · Take your medicines exactly as prescribed. Call your doctor if you think you are having a problem with your medicine. · Avoid irritation. ¨ Do not squeeze or pick at the lump. ¨ Do not stick a needle in it. · Prevent infection. Do not squeeze, drain, or puncture a painful lump. Doing this can irritate or inflame the lump, push any existing infection deeper into the skin, or cause severe bleeding. · Get extra rest. Slow down just a little from your usual routine. · Drink plenty of fluids, enough so that your urine is light yellow or clear like water. If you have kidney, heart, or liver disease and have to limit fluids, talk with your doctor before you increase the amount of fluids you drink. · Take an over-the-counter pain medicine, such as acetaminophen (Tylenol), ibuprofen (Advil, Motrin), or naproxen (Aleve). Read and follow all instructions on the label. · Do not take two or more pain medicines at the same time unless the doctor told you to.  Many pain medicines have acetaminophen, which is Tylenol. Too much acetaminophen (Tylenol) can be harmful. When should you call for help? Call your doctor now or seek immediate medical care if:  ? · You have worse symptoms of infection, such as:  ¨ Increased pain, swelling, warmth, or redness. ¨ Red streaks leading from the area. ¨ Pus draining from the area. ¨ A fever. ? Watch closely for changes in your health, and be sure to contact your doctor if:  ? · Your lymph nodes do not get smaller or do not return to normal.   ? · You do not get better as expected. Where can you learn more? Go to http://thea-dee.info/. Enter Y747 in the search box to learn more about \"Swollen Lymph Nodes: Care Instructions. \"  Current as of: March 3, 2017  Content Version: 11.4  © 8039-5549 [employer]. Care instructions adapted under license by [employer] (which disclaims liability or warranty for this information). If you have questions about a medical condition or this instruction, always ask your healthcare professional. [place] [address] any warranty or liability for your use of this information.

## 2018-05-26 ENCOUNTER — TELEPHONE (OUTPATIENT)
Dept: INTERNAL MEDICINE CLINIC | Age: 48
End: 2018-05-26

## 2018-05-26 NOTE — TELEPHONE ENCOUNTER
Notes that last her finger and feet became numb overnight. Denies any trauma to her neck. Denies any changes to medications. Feeling better overall. Notes overall feeling better. Mild nausea. No focal weakness. Only recent changes is Vitamin D def which has not been very low. Monitor symptoms, urgent evaluation if this continues. Otherwise can see me during the week to see if she has any vitamin def.

## 2018-05-30 ENCOUNTER — OFFICE VISIT (OUTPATIENT)
Dept: INTERNAL MEDICINE CLINIC | Age: 48
End: 2018-05-30

## 2018-05-30 VITALS
DIASTOLIC BLOOD PRESSURE: 68 MMHG | BODY MASS INDEX: 27.13 KG/M2 | HEART RATE: 69 BPM | OXYGEN SATURATION: 97 % | SYSTOLIC BLOOD PRESSURE: 101 MMHG | TEMPERATURE: 98.6 F | WEIGHT: 179 LBS | HEIGHT: 68 IN | RESPIRATION RATE: 16 BRPM

## 2018-05-30 DIAGNOSIS — R53.83 FATIGUE, UNSPECIFIED TYPE: ICD-10-CM

## 2018-05-30 DIAGNOSIS — G89.29 CHRONIC ARTHRALGIAS OF KNEES AND HIPS: Primary | ICD-10-CM

## 2018-05-30 DIAGNOSIS — M25.551 CHRONIC ARTHRALGIAS OF KNEES AND HIPS: Primary | ICD-10-CM

## 2018-05-30 DIAGNOSIS — F41.9 ANXIETY: ICD-10-CM

## 2018-05-30 DIAGNOSIS — M25.561 CHRONIC ARTHRALGIAS OF KNEES AND HIPS: Primary | ICD-10-CM

## 2018-05-30 DIAGNOSIS — M25.552 CHRONIC ARTHRALGIAS OF KNEES AND HIPS: Primary | ICD-10-CM
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
## 2018-05-30 DIAGNOSIS — M25.562 CHRONIC ARTHRALGIAS OF KNEES AND HIPS: Primary | ICD-10-CM

## 2018-05-30 DIAGNOSIS — R61 NIGHT SWEATS: ICD-10-CM

## 2018-05-30 RX ORDER — BUPROPION HYDROCHLORIDE 150 MG/1
150 TABLET ORAL
Qty: 30 TAB | Refills: 1 | Status: SHIPPED | OUTPATIENT
Start: 2018-05-30 | End: 2018-09-10 | Stop reason: SDUPTHER

## 2018-05-30 RX ORDER — BUPROPION HYDROCHLORIDE 150 MG/1
150 TABLET ORAL
Qty: 30 TAB | Refills: 0 | Status: SHIPPED | OUTPATIENT
Start: 2018-05-30 | End: 2018-05-30 | Stop reason: SDUPTHER

## 2018-05-30 RX ORDER — BISMUTH SUBSALICYLATE 262 MG
1 TABLET,CHEWABLE ORAL DAILY
COMMUNITY

## 2018-05-30 RX ORDER — ERGOCALCIFEROL 1.25 MG/1
50000 CAPSULE ORAL
COMMUNITY
End: 2019-05-08

## 2018-05-30 NOTE — MR AVS SNAPSHOT
[facility] 
 
 
 [address] [place] [address] [facility] 
[phone] Patient: Stephen Murrieta MRN:  [id number] Visit Information Date & Time Provider Department Dept. Phone Encounter #  
 5/30/2018 11:15 AM Abdifatah Blanchard MD [facility] [address] [id number] Upcoming Health Maintenance Date Due  
 PAP AKA CERVICAL CYTOLOGY 5/22/2016 COLONOSCOPY 10/26/2018 Influenza Age 5 to Adult 8/1/2018 DTaP/Tdap/Td series (2 - Td) 1/1/2020 Allergies as of 5/30/2018  Review Complete On: 1/01/2389 By: Teryl Splinter Wears Severity Noted Reaction Type Reactions Latex  03/16/2017    Unknown (comments) Lortab [Hydrocodone-acetaminophen] Medium 02/28/2011   Side Effect Nausea and Vomiting Nausea and vomiting Adhesive Tape-silicones  87/21/6988    Rash With extended use of the tape. Fioricet [Butalbital-acetaminophen-caff]  07/22/2013    Other (comments) Insomnia. Pt does not recall being allergic to this. Topamax [Topiramate]  02/28/2011    Other (comments) Memory loss - short term memory. Current Immunizations  Reviewed on 10/10/2011 Name Date Influenza Vaccine (Quad) PF 9/28/2016 10:29 AM  
 Influenza Vaccine PF 9/25/2013 Influenza Vaccine Split 10/10/2011 TDAP Vaccine 1/1/2010 Not reviewed this visit You Were Diagnosed With   
  
 Codes Comments Chronic arthralgias of knees and hips    -  Primary ICD-10-CM: M25.551, G89.29, M25.561, M25.552, M25.562 ICD-9-CM: 719.45, 719.46 Night sweats     ICD-10-CM: R61 
ICD-9-CM: 780.8 Tick bite, sequela     ICD-10-CM: W57. XXXS 
ICD-9-CM: 683. 2 Fatigue, unspecified type     ICD-10-CM: R53.83 ICD-9-CM: 780.79 Anxiety     ICD-10-CM: F41.9 ICD-9-CM: 300.00 Vitals BP Pulse Temp Resp Height(growth percentile) Weight(growth percentile) 101/68 (BP 1 Location: Left arm, BP Patient Position: Sitting) 69 98.6 °F (37 °C) (Oral) 16 5' 8\" (1.727 m) 179 lb (81.2 kg) LMP SpO2 BMI OB Status Smoking Status 11/02/2016 97% 27.22 kg/m2 Hysterectomy Never Smoker Vitals History BMI and BSA Data Body Mass Index Body Surface Area  
 27.22 kg/m 2 1.97 m 2 Preferred Pharmacy Pharmacy Name Phone [facility], [address] [phone] Your Updated Medication List  
  
   
This list is accurate as of 5/30/18 12:09 PM.  Always use your most recent med list.  
  
  
  
  
 baclofen 10 mg tablet Commonly known as:  LIORESAL  
TAKE ONE TABLET BY MOUTH THREE TIMES A DAY  
  
 buPROPion  mg tablet Commonly known as:  Kyrie Mon Take 1 Tab by mouth every morning. eletriptan 40 mg tablet Commonly known as:  RELPAX Take 1 Tab by mouth once as needed for up to 1 dose. Indications: Migraine MUCINEX 600 mg ER tablet Generic drug:  guaiFENesin ER Take 600 mg by mouth two (2) times a day. multivitamin tablet Commonly known as:  ONE A DAY Take 1 Tab by mouth daily. NASACORT 55 mcg nasal inhaler Generic drug:  triamcinolone 2 Sprays by Both Nostrils route daily. rizatriptan 10 mg disintegrating tablet Commonly known as:  MAXALT-MLT Take 1 Tab by mouth once as needed for Migraine for up to 1 dose. sertraline 100 mg tablet Commonly known as:  ZOLOFT  
TAKE ONE TABLET BY MOUTH DAILY topiramate ER 50 mg capsule Commonly known as:  TROKENDI XR Take 1 Cap by mouth daily. VITAMIN D2 50,000 unit capsule Generic drug:  ergocalciferol Take 50,000 Units by mouth every seven (7) days. ZyrTEC 10 mg tablet Generic drug:  cetirizine Take 10 mg by mouth daily. Prescriptions Sent to Pharmacy Refills  buPROPion XL (WELLBUTRIN XL) 150 mg tablet 0  
 Sig: Take 1 Tab by mouth every morning. Class: Normal  
 Pharmacy: [address] [facility] #: [phone] Route: Oral  
  
We Performed the Following LYME AB, IGG & IGM BY WB [17770 CPT(R)] TSH 3RD GENERATION [93215 CPT(R)] VITAMIN B12 & FOLATE [54849 CPT(R)] Introducing [facility]! Dear Kendell Mccarty: Thank you for requesting a [facility] account. Our records indicate that you already have an active [facility] account. You can access your account anytime at [email or web address] Did you know that you can access your hospital and ER discharge instructions at any time in [facility]? You can also review all of your test results from your hospital stay or ER visit. Additional Information If you have questions, please visit the Frequently Asked Questions section of the [facility] website at [email or web address] Remember, [facility] is NOT to be used for urgent needs. For medical emergencies, dial 911. Now available from your iPhone and Android! Please provide this summary of care documentation to your next provider. Lyme Disease Testing Disclaimer:   
 § 10.0-4041.5. (Expires July 1, 2018) Lyme disease testing information disclosure. A. Every licensee or his in-office designee who orders a laboratory test for the presence of Lyme disease shall provide to the patient or his legal representative the following written information: \"ACCORDING TO THE CENTERS FOR DISEASE CONTROL AND PREVENTION, AS OF 2011 LYME DISEASE IS THE SIXTH FASTEST GROWING DISEASE IN THE [place]. YOUR HEALTH CARE PROVIDER HAS ORDERED A LABORATORY TEST FOR THE PRESENCE OF LYME DISEASE FOR YOU.  CURRENT LABORATORY TESTING FOR LYME DISEASE CAN BE PROBLEMATIC AND STANDARD LABORATORY TESTS OFTEN RESULT IN FALSE NEGATIVE AND FALSE POSITIVE RESULTS, AND IF DONE TOO EARLY, YOU MAY NOT HAVE PRODUCED ENOUGH ANTIBODIES TO BE CONSIDERED POSITIVE BECAUSE YOUR IMMUNE RESPONSE REQUIRES TIME TO DEVELOP ANTIBODIES. IF YOU ARE TESTED FOR LYME DISEASE, AND THE RESULTS ARE NEGATIVE, THIS DOES NOT NECESSARILY MEAN YOU DO NOT HAVE LYME DISEASE. IF YOU CONTINUE TO EXPERIENCE SYMPTOMS, YOU SHOULD CONTACT YOUR HEALTH CARE PROVIDER AND INQUIRE ABOUT THE APPROPRIATENESS OF RETESTING OR ADDITIONAL TREATMENT. \"  
B. Licensees shall be immune from civil liability for the provision of the written information required by this section absent gross negligence or willful misconduct. Your primary care clinician is listed as Riri Feliz. If you have any questions after today's visit, please call [phone].

## 2018-05-30 NOTE — PROGRESS NOTES
Candelaria Yadav is a 56 y.o. female who presents today for Numbness; Headache; and Fatigue  . She has a history of   Patient Active Problem List   Diagnosis Code    Allergic rhinitis, cause unspecified J30.9    Calculus of kidney N20.0    Hepatic cyst K76.89    Splenic cyst D73.4    Migraine without aura and without status migrainosus, not intractable G43.009    Colon polyp K63.5    Right upper quadrant abdominal pain R10.11   . Today patient is here for an acute visit. Problem visit:  Candelaria Yadav is here for complaint of intermittent numbness and tingling. This started this weekend. Concerned for lyme disease as she had a tick bite in late April. Did have poison ivy in April as well. Notes that she is still having fatigue and night sweats. Denies any rash after bite. ROS  Review of Systems   Constitutional: Positive for malaise/fatigue. Negative for chills, fever and weight loss (inability to loose weight. ). Night sweats   HENT: Negative for congestion, ear discharge, ear pain, hearing loss, sore throat and tinnitus. Eyes: Negative for blurred vision, double vision, photophobia, pain, discharge and redness. Respiratory: Negative for cough, hemoptysis, sputum production and shortness of breath. Cardiovascular: Negative for chest pain, palpitations, orthopnea, claudication and leg swelling. Gastrointestinal: Negative for abdominal pain, blood in stool, constipation, diarrhea, heartburn, nausea and vomiting. Genitourinary: Negative for dysuria, flank pain, frequency, hematuria and urgency. Musculoskeletal: Positive for myalgias. Negative for back pain, falls, joint pain and neck pain. Skin: Negative for rash. Neurological: Positive for tingling. Negative for dizziness, tremors, sensory change, focal weakness, seizures and headaches. Resolved numbness   Endo/Heme/Allergies: Does not bruise/bleed easily.    Psychiatric/Behavioral: Negative for depression, hallucinations, memory loss, substance abuse and suicidal ideas. Visit Vitals    /68 (BP 1 Location: Left arm, BP Patient Position: Sitting)    Pulse 69    Temp 98.6 °F (37 °C) (Oral)    Resp 16    Ht 5' 8\" (1.727 m)    Wt 179 lb (81.2 kg)    SpO2 97%    BMI 27.22 kg/m2       Physical Exam   Constitutional: She is oriented to person, place, and time. She appears well-developed and well-nourished. HENT:   Head: Normocephalic and atraumatic. Right Ear: External ear normal.   Left Ear: External ear normal.   Cardiovascular: Normal rate and regular rhythm. No murmur heard. Pulmonary/Chest: Effort normal and breath sounds normal. No respiratory distress. Abdominal: Soft. Bowel sounds are normal.   Musculoskeletal: Normal range of motion. She exhibits no edema. Neurological: She is alert and oriented to person, place, and time. No cranial nerve deficit. Normal LE and UE neuro exam.    Skin: Skin is warm and dry. Psychiatric: She has a normal mood and affect. Her behavior is normal.         Current Outpatient Prescriptions   Medication Sig    ergocalciferol (VITAMIN D2) 50,000 unit capsule Take 50,000 Units by mouth every seven (7) days.  multivitamin (ONE A DAY) tablet Take 1 Tab by mouth daily.  buPROPion XL (WELLBUTRIN XL) 150 mg tablet Take 1 Tab by mouth every morning.  sertraline (ZOLOFT) 100 mg tablet TAKE ONE TABLET BY MOUTH DAILY    eletriptan (RELPAX) 40 mg tablet Take 1 Tab by mouth once as needed for up to 1 dose. Indications: Migraine    baclofen (LIORESAL) 10 mg tablet TAKE ONE TABLET BY MOUTH THREE TIMES A DAY    rizatriptan (MAXALT-MLT) 10 mg disintegrating tablet Take 1 Tab by mouth once as needed for Migraine for up to 1 dose.  triamcinolone (NASACORT) 55 mcg nasal inhaler 2 Sprays by Both Nostrils route daily.  cetirizine (ZYRTEC) 10 mg tablet Take 10 mg by mouth daily.     guaiFENesin ER (MUCINEX) 600 mg ER tablet Take 600 mg by mouth two (2) times a day.  topiramate ER (TROKENDI XR) 50 mg capsule Take 1 Cap by mouth daily. No current facility-administered medications for this visit. Past Medical History:   Diagnosis Date    Allergic rhinitis, cause unspecified     Calculus of kidney     Calculus of kidney     Colon polyp 11/14/2016    Depression     Headache(784.0)     Hepatic cyst 9/7/2016    HX OTHER MEDICAL     migraines and kidney stones    Migraine       Past Surgical History:   Procedure Laterality Date    COLONOSCOPY N/A 11/14/2016    COLONOSCOPY WITH ENDOSCOPIC SUBMUCOSAL DISSECTION (ESD) performed by Dilcia Duran MD at [address] HX BREAST BIOPSY Right 2016    neg;ductogram    HX BREAST BIOPSY Right 2016    Neg; 2 MRI after ductogram    HX BREAST BIOPSY Left 2016    neg; mri bx    HX COLONOSCOPY  2016    HX GYN      novasure procedure    HX HEENT      tonsils and adenoids - once as of 11/11/2016    HX HYSTERECTOMY  2016    HX OTHER SURGICAL      broken right arm x2 - no surgery    HX TONSIL AND ADENOIDECTOMY  1978      Social History   Substance Use Topics    Smoking status: Never Smoker    Smokeless tobacco: Never Used    Alcohol use Yes      Comment: 1-2 drinks per week      Family History   Problem Relation Age of Onset    Thyroid Disease Mother     Cancer Mother      skin - BCCA/squamous    Hypertension Father     Heart Disease Father     Other Father      ankylosing spondylitis    Elevated Lipids Father     Hypertension Paternal Grandmother     Elevated Lipids Paternal Grandmother     Breast Cancer Maternal Aunt 48    Cancer Maternal Aunt      breast    Heart Disease Paternal Grandfather         Allergies   Allergen Reactions    Latex Unknown (comments)    Lortab [Hydrocodone-Acetaminophen] Nausea and Vomiting     Nausea and vomiting    Adhesive Tape-Silicones Rash     With extended use of the tape.     Fioricet [Butalbital-Acetaminophen-Caff] Other (comments) Insomnia. Pt does not recall being allergic to this.  Topamax [Topiramate] Other (comments)     Memory loss - short term memory. Assessment/Plan  Diagnoses and all orders for this visit:    1. Chronic arthralgias of knees and hips - low concern for lyme given duration of tick attachment, but will screen. B12 due to neurological symptoms. ? Cervical issue if this continues, but benign physical exam.   -     LYME AB, IGG & IGM BY WB  -     VITAMIN B12 & FOLATE  -     buPROPion XL (WELLBUTRIN XL) 150 mg tablet; Take 1 Tab by mouth every morning. 2. Night sweats - Notes that GYN tested sex hormones and these are normal.   -     LYME AB, IGG & IGM BY WB  -     VITAMIN B12 & FOLATE  -     buPROPion XL (WELLBUTRIN XL) 150 mg tablet; Take 1 Tab by mouth every morning. 3. Tick bite, sequela  -     LYME AB, IGG & IGM BY WB  -     VITAMIN B12 & FOLATE    4. Fatigue, unspecified type - See if wellbutrin helps if testing is negative   -     TSH 3RD GENERATION  -     buPROPion XL (WELLBUTRIN XL) 150 mg tablet; Take 1 Tab by mouth every morning. 5. Anxiety - great results with zoloft, but concerned about not able to loose weight. Try wellbutrin. Could try trintellix. -     buPROPion XL (WELLBUTRIN XL) 150 mg tablet; Take 1 Tab by mouth every morning.         Follow-up Disposition: Not on File    Sanchez Orozco MD  5/30/2018

## 2018-06-01 LAB
B BURGDOR IGG PATRN SER IB-IMP: NEGATIVE
B BURGDOR IGM PATRN SER IB-IMP: NEGATIVE
B BURGDOR18KD IGG SER QL IB: ABNORMAL
B BURGDOR23KD IGG SER QL IB: ABNORMAL
B BURGDOR23KD IGM SER QL IB: ABNORMAL
B BURGDOR28KD IGG SER QL IB: ABNORMAL
B BURGDOR30KD IGG SER QL IB: ABNORMAL
B BURGDOR39KD IGG SER QL IB: ABNORMAL
B BURGDOR39KD IGM SER QL IB: ABNORMAL
B BURGDOR41KD IGG SER QL IB: PRESENT
B BURGDOR41KD IGM SER QL IB: ABNORMAL
B BURGDOR45KD IGG SER QL IB: ABNORMAL
B BURGDOR58KD IGG SER QL IB: ABNORMAL
B BURGDOR66KD IGG SER QL IB: ABNORMAL
B BURGDOR93KD IGG SER QL IB: PRESENT
FOLATE SERPL-MCNC: 18.5 NG/ML
TSH SERPL DL<=0.005 MIU/L-ACNC: 0.94 UIU/ML (ref 0.45–4.5)
VIT B12 SERPL-MCNC: 773 PG/ML (ref 232–1245)

## 2018-06-05 DIAGNOSIS — R20.0 NUMBNESS AND TINGLING: Primary | ICD-10-CM

## 2018-06-05 DIAGNOSIS — R20.2 NUMBNESS AND TINGLING: Primary | ICD-10-CM

## 2018-06-06 ENCOUNTER — HOSPITAL ENCOUNTER (OUTPATIENT)
Dept: GENERAL RADIOLOGY | Age: 48
Discharge: HOME | End: 2018-06-06
Attending: INTERNAL MEDICINE
Payer: COMMERCIAL

## 2018-06-06 DIAGNOSIS — R20.2 NUMBNESS AND TINGLING: ICD-10-CM

## 2018-06-06 DIAGNOSIS — R20.0 NUMBNESS AND TINGLING: ICD-10-CM

## 2018-06-06 PROCEDURE — 72050 X-RAY EXAM NECK SPINE 4/5VWS: CPT

## 2018-09-10 DIAGNOSIS — G89.29 CHRONIC ARTHRALGIAS OF KNEES AND HIPS: ICD-10-CM

## 2018-09-10 DIAGNOSIS — R61 NIGHT SWEATS: ICD-10-CM

## 2018-09-10 DIAGNOSIS — M25.552 CHRONIC ARTHRALGIAS OF KNEES AND HIPS: ICD-10-CM

## 2018-09-10 DIAGNOSIS — M25.561 CHRONIC ARTHRALGIAS OF KNEES AND HIPS: ICD-10-CM

## 2018-09-10 DIAGNOSIS — F41.9 ANXIETY: ICD-10-CM

## 2018-09-10 DIAGNOSIS — M25.551 CHRONIC ARTHRALGIAS OF KNEES AND HIPS: ICD-10-CM

## 2018-09-10 DIAGNOSIS — R53.83 FATIGUE, UNSPECIFIED TYPE: ICD-10-CM

## 2018-09-10 DIAGNOSIS — M25.562 CHRONIC ARTHRALGIAS OF KNEES AND HIPS: ICD-10-CM

## 2018-09-10 RX ORDER — SERTRALINE HYDROCHLORIDE 100 MG/1
TABLET, FILM COATED ORAL
Qty: 30 TAB | Refills: 3 | Status: SHIPPED | OUTPATIENT
Start: 2018-09-10 | End: 2019-02-01 | Stop reason: SDUPTHER

## 2018-09-10 RX ORDER — BUPROPION HYDROCHLORIDE 150 MG/1
TABLET ORAL
Qty: 30 TAB | Refills: 0 | Status: SHIPPED | OUTPATIENT
Start: 2018-09-10 | End: 2018-12-14

## 2018-10-01 ENCOUNTER — HOSPITAL ENCOUNTER (OUTPATIENT)
Dept: LAB | Age: 48
Discharge: HOME | End: 2018-10-01

## 2018-11-12 ENCOUNTER — OFFICE VISIT (OUTPATIENT)
Dept: INTERNAL MEDICINE CLINIC | Age: 48
End: 2018-11-12

## 2018-11-12 ENCOUNTER — HOSPITAL ENCOUNTER (OUTPATIENT)
Dept: GENERAL RADIOLOGY | Age: 48
Discharge: HOME | End: 2018-11-12
Attending: INTERNAL MEDICINE
Payer: COMMERCIAL

## 2018-11-12 VITALS
DIASTOLIC BLOOD PRESSURE: 65 MMHG | HEART RATE: 64 BPM | TEMPERATURE: 98.1 F | HEIGHT: 68 IN | OXYGEN SATURATION: 98 % | BODY MASS INDEX: 27.37 KG/M2 | SYSTOLIC BLOOD PRESSURE: 100 MMHG | RESPIRATION RATE: 14 BRPM | WEIGHT: 180.6 LBS

## 2018-11-12 DIAGNOSIS — J20.8 ACUTE BRONCHITIS DUE TO OTHER SPECIFIED ORGANISMS: Primary | ICD-10-CM

## 2018-11-12 DIAGNOSIS — J20.8 ACUTE BRONCHITIS DUE TO OTHER SPECIFIED ORGANISMS: ICD-10-CM

## 2018-11-12 PROCEDURE — 71046 X-RAY EXAM CHEST 2 VIEWS: CPT

## 2018-11-12 RX ORDER — AZITHROMYCIN 250 MG/1
250 TABLET, FILM COATED ORAL SEE ADMIN INSTRUCTIONS
Qty: 6 TAB | Refills: 0 | Status: SHIPPED | OUTPATIENT
Start: 2018-11-12 | End: 2018-11-17

## 2018-11-12 RX ORDER — MELOXICAM 15 MG/1
TABLET ORAL
COMMUNITY
Start: 2018-10-22 | End: 2019-05-08 | Stop reason: SDUPTHER

## 2018-11-12 RX ORDER — ALBUTEROL SULFATE 90 UG/1
1 AEROSOL, METERED RESPIRATORY (INHALATION)
Qty: 1 INHALER | Refills: 0 | Status: SHIPPED | OUTPATIENT
Start: 2018-11-12 | End: 2021-06-03

## 2018-11-12 NOTE — PROGRESS NOTES
HISTORY OF PRESENT ILLNESS  Radha Johnson is a 53 y.o. female. HPI   She has been coughing for a 1.5 week and she is feeling a little better but now feels like it is rattling and coughed a lot yesterday and could not catch her breath and she feels like she she is breathing she sounds like a smoker when she is taking a deep breath or at end of a breath- she has been taking some mucinex as well          Review of Systems   Constitutional: Negative. Negative for chills, diaphoresis, fever and malaise/fatigue. HENT: Negative for congestion, ear discharge, ear pain, sore throat and tinnitus. Eyes: Negative for blurred vision, double vision, photophobia and pain. Respiratory: Negative for cough, hemoptysis, sputum production, shortness of breath, wheezing and stridor. Cardiovascular: Negative for chest pain, palpitations, orthopnea, claudication, leg swelling and PND. Gastrointestinal: Negative for abdominal pain, blood in stool, constipation, diarrhea, nausea and vomiting. Musculoskeletal: Negative for back pain, joint pain, myalgias and neck pain. Skin: Negative. Negative for itching and rash. Neurological: Negative for dizziness, tingling, weakness and headaches. Endo/Heme/Allergies: Negative for environmental allergies. Psychiatric/Behavioral: Negative. All other systems reviewed and are negative. Physical Exam   Constitutional: She appears well-developed and well-nourished. HENT:   Head: Normocephalic and atraumatic. Right Ear: External ear and ear canal normal.   Left Ear: External ear and ear canal normal.   Nose: No mucosal edema or rhinorrhea. Right sinus exhibits no maxillary sinus tenderness and no frontal sinus tenderness. Left sinus exhibits no maxillary sinus tenderness and no frontal sinus tenderness. Mouth/Throat: Uvula is midline and mucous membranes are normal.   Eyes: Pupils are equal, round, and reactive to light. Neck: Normal range of motion. Neck supple.  No thyromegaly present. Cardiovascular: Normal rate, regular rhythm, normal heart sounds and intact distal pulses. Pulmonary/Chest: Effort normal. She has wheezes. Abdominal: Soft. Bowel sounds are normal.   Musculoskeletal: Normal range of motion. Lymphadenopathy:     She has no cervical adenopathy. Skin: Skin is warm and dry. Psychiatric: She has a normal mood and affect. Her behavior is normal. Judgment and thought content normal.   Nursing note and vitals reviewed. ASSESSMENT and PLAN  Diagnoses and all orders for this visit:    1. Acute bronchitis due to other specified organisms  -     XR CHEST PA LAT; Future  -     azithromycin (ZITHROMAX) 250 mg tablet; Take 1 Tab by mouth See Admin Instructions for 5 days. -     albuterol (PROVENTIL HFA, VENTOLIN HFA, PROAIR HFA) 90 mcg/actuation inhaler; Take 1 Puff by inhalation every four (4) hours as needed for Wheezing. will get xray and she will call if not better  This note will not be viewable in [address]           lab results and schedule of future lab studies reviewed with patient  cardiovascular risk and specific lipid/LDL goals reviewed  reviewed medications and side effects in detail

## 2018-11-20 ENCOUNTER — HOSPITAL ENCOUNTER (OUTPATIENT)
Dept: MAMMOGRAPHY | Age: 48
Discharge: HOME | End: 2018-11-20
Payer: COMMERCIAL

## 2018-11-20 DIAGNOSIS — Z12.39 SCREENING BREAST EXAMINATION: ICD-10-CM

## 2018-11-20 PROCEDURE — 77067 SCR MAMMO BI INCL CAD: CPT

## 2018-12-14 ENCOUNTER — OFFICE VISIT (OUTPATIENT)
Dept: INTERNAL MEDICINE CLINIC | Age: 48
End: 2018-12-14

## 2018-12-14 VITALS
BODY MASS INDEX: 26.83 KG/M2 | OXYGEN SATURATION: 98 % | WEIGHT: 177 LBS | HEIGHT: 68 IN | TEMPERATURE: 98.9 F | DIASTOLIC BLOOD PRESSURE: 80 MMHG | HEART RATE: 64 BPM | SYSTOLIC BLOOD PRESSURE: 102 MMHG | RESPIRATION RATE: 16 BRPM

## 2018-12-14 DIAGNOSIS — J45.909 MILD REACTIVE AIRWAYS DISEASE, UNSPECIFIED WHETHER PERSISTENT: Primary | ICD-10-CM

## 2018-12-14 RX ORDER — GLUCOSAMINE SULFATE 1500 MG
POWDER IN PACKET (EA) ORAL DAILY
COMMUNITY
End: 2022-01-21

## 2018-12-14 RX ORDER — FLUTICASONE FUROATE AND VILANTEROL 200; 25 UG/1; UG/1
1 POWDER RESPIRATORY (INHALATION) DAILY
Qty: 1 INHALER | Refills: 3 | Status: SHIPPED | OUTPATIENT
Start: 2018-12-14 | End: 2019-05-08

## 2018-12-14 RX ORDER — GABAPENTIN 100 MG/1
CAPSULE ORAL 3 TIMES DAILY
COMMUNITY
End: 2019-05-08

## 2018-12-14 NOTE — PATIENT INSTRUCTIONS
Asthma in Adults: Care Instructions  Your Care Instructions    During an asthma attack, your airways swell and narrow as a reaction to certain things (triggers). This makes it hard to breathe. You may be able to prevent asthma attacks if you avoid the things that set off your asthma symptoms. Keeping your asthma under control and treating symptoms before they get bad can help you avoid severe attacks. If you can control your asthma, you may be able to do all of your normal daily activities. You may also avoid asthma attacks and trips to the hospital.  Follow-up care is a key part of your treatment and safety. Be sure to make and go to all appointments, and call your doctor if you are having problems. It's also a good idea to know your test results and keep a list of the medicines you take. How can you care for yourself at home? · Follow your asthma action plan so you can manage your symptoms at home. An asthma action plan will help you prevent and control airway reactions and will tell you what to do during an asthma attack. If you do not have an asthma action plan, work with your doctor to build one. · Take your asthma medicine exactly as prescribed. Medicine plays an important role in controlling asthma. Talk to your doctor right away if you have any questions about what to take and how to take it. ? Use your quick-relief medicine when you have symptoms of an attack. Quick-relief medicine often is an albuterol inhaler. Some people need to use quick-relief medicine before they exercise. ? Take your controller medicine every day, not just when you have symptoms. Controller medicine is usually an inhaled corticosteroid. The goal is to prevent problems before they occur. Do not use your controller medicine to try to treat an attack that has already started. It does not work fast enough to help. ? If your doctor prescribed corticosteroid pills to use during an attack, take them as directed.  They may take hours to work, but they may shorten the attack and help you breathe better. ? Keep your quick-relief medicine with you at all times. · Talk to your doctor before using other medicines. Some medicines, such as aspirin, can cause asthma attacks in some people. · Check yourself for asthma symptoms to know which step to follow in your action plan. Watch for things like being short of breath, having chest tightness, coughing, and wheezing. Also notice if symptoms wake you up at night or if you get tired quickly when you exercise. · If you have a peak flow meter, use it to check how well you are breathing. This can help you predict when an asthma attack is going to occur. Then you can take medicine to prevent the asthma attack or make it less severe. · See your doctor regularly. These visits will help you learn more about asthma and what you can do to control it. Your doctor will monitor your treatment to make sure the medicine is helping you. · Keep track of your asthma attacks and your treatment. After you have had an attack, write down what triggered it, what helped end it, and any concerns you have about your asthma action plan. Take your diary when you see your doctor. You can then review your asthma action plan and decide if it is working. · Do not smoke or allow others to smoke around you. Avoid smoky places. Smoking makes asthma worse. If you need help quitting, talk to your doctor about stop-smoking programs and medicines. These can increase your chances of quitting for good. · Learn what triggers an asthma attack for you, and avoid the triggers when you can. Common triggers include colds, smoke, air pollution, dust, pollen, mold, pets, cockroaches, stress, and cold air. · Avoid colds and the flu. Get a pneumococcal vaccine shot. If you have had one before, ask your doctor whether you need a second dose. Get a flu vaccine every fall.  If you must be around people with colds or the flu, wash your hands often.  When should you call for help? Call 911 anytime you think you may need emergency care. For example, call if:    · You have severe trouble breathing.    Call your doctor now or seek immediate medical care if:    · Your symptoms do not get better after you have followed your asthma action plan.     · You cough up yellow, dark brown, or bloody mucus (sputum).    Watch closely for changes in your health, and be sure to contact your doctor if:    · Your coughing and wheezing get worse.     · You need to use quick-relief medicine on more than 2 days a week (unless it is just for exercise).     · You need help figuring out what is triggering your asthma attacks. Where can you learn more? Go to http://thea-dee.info/. Enter P597 in the search box to learn more about \"Asthma in Adults: Care Instructions. \"  Current as of: December 6, 2017  Content Version: 11.8  © 5891-3757 [employer], [employer]. Care instructions adapted under license by [employer] (which disclaims liability or warranty for this information). If you have questions about a medical condition or this instruction, always ask your healthcare professional. [place] [address] any warranty or liability for your use of this information.

## 2018-12-14 NOTE — PROGRESS NOTES
Deondre Barth is a 53 y.o. female who presents today for Cough and Wheezing  . She has a history of   Patient Active Problem List   Diagnosis Code    Allergic rhinitis, cause unspecified J30.9    Calculus of kidney N20.0    Hepatic cyst K76.89    Splenic cyst D73.4    Migraine without aura and without status migrainosus, not intractable G43.009    Colon polyp K63.5    Right upper quadrant abdominal pain R10.11   . Today patient is here for an acute visit. Upper respiratory illness:  Deondre Barth presents with complaints of congestion, sore throat, dry cough and hoarseness for several weeks. no nausea and no vomiting . she has not had  fever and chills. These seem to have all been triggered by URI in Oct-Nov. Symptoms are moderate. Over-the-counter remedies including Zyrtec and Mucinex. Drinking plenty of fluids: yes  Asthma?:  no  non-smoker  Contacts with similar infections: no   Of note she was seen here in the office by Dr. Juaquin Lemus on [phone] for acute bronchitis. She was prescribed azithromycin and albuterol inhaler. Her chest x-ray was normal.    She did see Dr. Roseline Hurtado for her neck pain and hand numbness. She was prescribed gabapentin and Mobic and she is done some physical therapy. She is notes since she is doing well. She is only taken the gabapentin and Mobic as needed. 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 This lesion is healing well. ROS  Review of Systems   Constitutional: Negative for chills, fever, malaise/fatigue and weight loss. HENT: Positive for congestion. Negative for ear discharge, ear pain, hearing loss, nosebleeds, sinus pain, sore throat and tinnitus. Respiratory: Positive for cough, shortness of breath and wheezing. Negative for hemoptysis, sputum production and stridor. Cardiovascular: Negative for chest pain, palpitations, orthopnea, claudication and leg swelling.    Gastrointestinal: Negative for abdominal pain, heartburn, nausea and vomiting. Genitourinary: Negative for dysuria, frequency, hematuria and urgency. Musculoskeletal: Positive for neck pain. Negative for back pain, falls, joint pain and myalgias. Skin: Negative for itching and rash. Neurological: Negative. Endo/Heme/Allergies: Does not bruise/bleed easily. Psychiatric/Behavioral: Negative for depression, hallucinations, substance abuse and suicidal ideas. The patient is not nervous/anxious. Visit Vitals  /80 (BP 1 Location: Left arm, BP Patient Position: Sitting)   Pulse 64   Temp 98.9 °F (37.2 °C) (Oral)   Resp 16   Ht 5' 8\" (1.727 m)   Wt 177 lb (80.3 kg)   SpO2 98%   BMI 26.91 kg/m²       Physical Exam   Constitutional: She is oriented to person, place, and time. She appears well-developed and well-nourished. HENT:   Head: Normocephalic and atraumatic. Right Ear: External ear normal.   Left Ear: External ear normal.   Mouth/Throat: Oropharynx is clear and moist.   Goal trauma to left tympanic membrane. No fluid behind both tympanic membranes   Neck: Normal range of motion. Neck supple. Cardiovascular: Normal rate and regular rhythm. No murmur heard. Pulmonary/Chest: Effort normal. No respiratory distress. She has no wheezes. Coughing during exam.  No significant wheezing today. No egophony   Neurological: She is alert and oriented to person, place, and time. Skin: Skin is warm and dry. Psychiatric: She has a normal mood and affect. Her behavior is normal.         Current Outpatient Medications   Medication Sig    cholecalciferol (VITAMIN D3) 1,000 unit cap Take  by mouth daily.  gabapentin (NEURONTIN) 100 mg capsule Take  by mouth three (3) times daily.  meloxicam (MOBIC) 15 mg tablet TAKE ONE TABLET BY MOUTH DAILY WITH FOOD AS NEEDED FOR PAIN    albuterol (PROVENTIL HFA, VENTOLIN HFA, PROAIR HFA) 90 mcg/actuation inhaler Take 1 Puff by inhalation every four (4) hours as needed for Wheezing.     sertraline (ZOLOFT) 100 mg tablet TAKE ONE TABLET BY MOUTH DAILY    multivitamin (ONE A DAY) tablet Take 1 Tab by mouth daily.  eletriptan (RELPAX) 40 mg tablet Take 1 Tab by mouth once as needed for up to 1 dose. Indications: Migraine    rizatriptan (MAXALT-MLT) 10 mg disintegrating tablet Take 1 Tab by mouth once as needed for Migraine for up to 1 dose.  cetirizine (ZYRTEC) 10 mg tablet Take 10 mg by mouth daily.  buPROPion XL (WELLBUTRIN XL) 150 mg tablet TAKE ONE TABLET BY MOUTH EVERY MORNING    ergocalciferol (VITAMIN D2) 50,000 unit capsule Take 50,000 Units by mouth every seven (7) days.  guaiFENesin ER (MUCINEX) 600 mg ER tablet Take 600 mg by mouth two (2) times a day.  topiramate ER (TROKENDI XR) 50 mg capsule Take 1 Cap by mouth daily. No current facility-administered medications for this visit.          Past Medical History:   Diagnosis Date    Allergic rhinitis, cause unspecified     Calculus of kidney     Calculus of kidney     Colon polyp 11/14/2016    Depression     Headache(784.0)     Hepatic cyst 9/7/2016    HX OTHER MEDICAL     migraines and kidney stones    Migraine       Past Surgical History:   Procedure Laterality Date    COLONOSCOPY N/A 11/14/2016    COLONOSCOPY WITH ENDOSCOPIC SUBMUCOSAL DISSECTION (ESD) performed [facility] Wend[facility] Right 2016    neg;ductogram    HX BREAST BIOPSY Right 2016    Neg; 2 MRI bx after ductogram    HX BREAST BIOPSY Left 2016    neg; mri bx    HX COLONOSCOPY  2016    HX GYN      novasure procedure    HX HEENT      tonsils and adenoids - once as of 11/11/2016    HX HYSTERECTOMY  2016    HX OTHER SURGICAL      broken right arm x2 - no surgery    HX SKIN BIOPSY      HX TONSIL AND ADENOIDECTOMY  1978      Social History     Tobacco Use    Smoking status: Never Smoker    Smokeless tobacco: Never Used   Substance Use Topics    Alcohol use: Yes     Comment: 1-2 drinks per week      Family History   Problem Relation Age of Onset    Thyroid Disease Mother     Cancer Mother         skin - BCCA/squamous    Hypertension Father     Heart Disease Father     Other Father         ankylosing spondylitis    Elevated Lipids Father     Hypertension Paternal Grandmother     Elevated Lipids Paternal Grandmother     Breast Cancer Maternal Aunt 48    Cancer Maternal Aunt         breast    Heart Disease Paternal Grandfather         Allergies   Allergen Reactions    Latex Unknown (comments)    Lortab [Hydrocodone-Acetaminophen] Nausea and Vomiting     Nausea and vomiting    Adhesive Tape-Silicones Rash     With extended use of the tape.  Fioricet [Butalbital-Acetaminophen-Caff] Other (comments)     Insomnia. Pt does not recall being allergic to this.  Topamax [Topiramate] Other (comments)     Memory loss - short term memory. Assessment/Plan  Diagnoses and all orders for this visit:    1. Mild reactive airways disease, unspecified whether persistent -to need postinfectious cough. Suggest ICS Labe for short period of time to calm down her airways. Will try to avoid systemic steroids for now. No signs of active bacterial infection. Continue as needed albuterol.  -     fluticasone-vilanterol (BREO ELLIPTA) 200-25 mcg/dose inhaler; Take 1 Puff by inhalation daily.         Follow-up Disposition: Not on File    Mary Monge MD  12/14/2018

## 2019-02-01 RX ORDER — SERTRALINE HYDROCHLORIDE 100 MG/1
TABLET, FILM COATED ORAL
Qty: 30 TAB | Refills: 2 | Status: SHIPPED | OUTPATIENT
Start: 2019-02-01 | End: 2019-05-08

## 2019-02-26 ENCOUNTER — TELEPHONE (OUTPATIENT)
Dept: INTERNAL MEDICINE CLINIC | Age: 49
End: 2019-02-26

## 2019-02-26 RX ORDER — OSELTAMIVIR PHOSPHATE 75 MG/1
75 CAPSULE ORAL DAILY
Qty: 10 CAP | Refills: 0 | Status: SHIPPED | OUTPATIENT
Start: 2019-02-26 | End: 2019-03-08

## 2019-02-26 NOTE — TELEPHONE ENCOUNTER
Patient's child was diagnosed with the flu and they gave the other child a preventative medication. Patient would like Tamiflu as the sick child is constantly coughing in her face. She received an e-mail response back from [place], however, she would prefer to speak with Dr. Mccray Ours himself. Please call patient at [phone]. Thanks. She also wanted to inform Dr. Mccray Ours that she has a follow up mammo and [address] appointment next week for tenderness issues and swollen lymph nodes.

## 2019-03-04 ENCOUNTER — HOSPITAL ENCOUNTER (OUTPATIENT)
Dept: MAMMOGRAPHY | Age: 49
Discharge: HOME | End: 2019-03-04
Attending: OBSTETRICS & GYNECOLOGY
Payer: COMMERCIAL

## 2019-03-04 DIAGNOSIS — N63.0 BREAST LUMP: ICD-10-CM

## 2019-03-04 DIAGNOSIS — Z80.3 FAMILY HISTORY OF BREAST CANCER: ICD-10-CM

## 2019-03-04 DIAGNOSIS — N60.19 FIBROCYSTIC BREAST: ICD-10-CM

## 2019-03-04 DIAGNOSIS — R92.8 ABNORMAL MAMMOGRAM: ICD-10-CM

## 2019-03-04 DIAGNOSIS — N64.4 BREAST PAIN: ICD-10-CM

## 2019-03-04 DIAGNOSIS — N64.52 NIPPLE DISCHARGE: ICD-10-CM

## 2019-03-04 PROCEDURE — 76642 ULTRASOUND BREAST LIMITED: CPT

## 2019-03-04 PROCEDURE — 77067 SCR MAMMO BI INCL CAD: CPT

## 2019-03-04 PROCEDURE — 77066 DX MAMMO INCL CAD BI: CPT

## 2019-05-06 DIAGNOSIS — G43.009 NONINTRACTABLE MIGRAINE, UNSPECIFIED MIGRAINE TYPE: ICD-10-CM

## 2019-05-07 RX ORDER — RIZATRIPTAN BENZOATE 10 MG/1
10 TABLET, ORALLY DISINTEGRATING ORAL
Qty: 9 TAB | Refills: 2 | Status: SHIPPED | OUTPATIENT
Start: 2019-05-07 | End: 2020-09-14

## 2019-05-08 ENCOUNTER — OFFICE VISIT (OUTPATIENT)
Dept: INTERNAL MEDICINE CLINIC | Age: 49
End: 2019-05-08

## 2019-05-08 VITALS
HEART RATE: 83 BPM | TEMPERATURE: 98.1 F | RESPIRATION RATE: 16 BRPM | DIASTOLIC BLOOD PRESSURE: 68 MMHG | BODY MASS INDEX: 26.07 KG/M2 | SYSTOLIC BLOOD PRESSURE: 100 MMHG | WEIGHT: 172 LBS | HEIGHT: 68 IN | OXYGEN SATURATION: 98 %

## 2019-05-08 DIAGNOSIS — G43.009 MIGRAINE WITHOUT AURA AND WITHOUT STATUS MIGRAINOSUS, NOT INTRACTABLE: ICD-10-CM

## 2019-05-08 DIAGNOSIS — F43.20 ADJUSTMENT DISORDER, UNSPECIFIED TYPE: Primary | ICD-10-CM

## 2019-05-08 DIAGNOSIS — M50.30 DDD (DEGENERATIVE DISC DISEASE), CERVICAL: ICD-10-CM

## 2019-05-08 DIAGNOSIS — M25.50 ARTHRALGIA, UNSPECIFIED JOINT: ICD-10-CM

## 2019-05-08 RX ORDER — RIZATRIPTAN BENZOATE 10 MG/1
10 TABLET, ORALLY DISINTEGRATING ORAL
Qty: 9 TAB | Refills: 3 | Status: SHIPPED | OUTPATIENT
Start: 2019-05-08 | End: 2019-05-08

## 2019-05-08 RX ORDER — RIZATRIPTAN BENZOATE 10 MG/1
10 TABLET, ORALLY DISINTEGRATING ORAL
COMMUNITY
End: 2019-05-08 | Stop reason: SDUPTHER

## 2019-05-08 RX ORDER — MELOXICAM 15 MG/1
TABLET ORAL
Qty: 90 TAB | Refills: 1 | Status: SHIPPED | OUTPATIENT
Start: 2019-05-08 | End: 2021-03-16

## 2019-05-08 RX ORDER — GABAPENTIN 300 MG/1
300 CAPSULE ORAL 3 TIMES DAILY
COMMUNITY
End: 2021-03-16

## 2019-05-08 RX ORDER — ELETRIPTAN HYDROBROMIDE 40 MG/1
40 TABLET, FILM COATED ORAL
Qty: 9 TAB | Refills: 5 | Status: SHIPPED | OUTPATIENT
Start: 2019-05-08 | End: 2019-05-08

## 2019-05-08 NOTE — PROGRESS NOTES
Marin Siu is a 53 y.o. female who presents today for Medication Evaluation; Anxiety; and Migraine  . She has a history of   Patient Active Problem List   Diagnosis Code    Allergic rhinitis, cause unspecified J30.9    Calculus of kidney N20.0    Hepatic cyst K76.89    Splenic cyst D73.4    Migraine without aura and without status migrainosus, not intractable G43.009    Colon polyp K63.5    Right upper quadrant abdominal pain R10.11   . Today patient is here for an acute visit. .   she does have other concerns. Anxiety/adjustment disorder: Patient had been placed on Zoloft and this is really helped her out with her mood overall. She decided to stop this as she felt that she was more like a zombie while on this. Notes that her issues are with her , their communication, and their relationship. She is now seeing a  who is acting like a therapist.  Notes that her  refuses to see a therapist.  She feels more clear minded since she is been off the SSRI. We discussed that SSRIs to help her with her mood overall and if her she feels like her mood is stable it is okay to stay off of this. Patient is seen for anxiety disorder. Current treatment includes nothing and individual therapy. Ongoing symptoms include: still having relationship issues. Patient denies: sweating, chest pain, dizziness, paresthesias, racing thoughts, feelings of losing control, difficulty concentrating, suicidal ideation. Denies substance or alcohol abuse. Reported side effects from the treatment: none. Migraines: Patient has been continued on PRN Maxalt and Relpax for her migraines. She notes that these have been increased with allergies. Overall she would like to stay with a triptan's and not put on a preventative. Cervical DDD: Seen by orthopedist who suggested physical therapy. She is also been placed on meloxicam and gabapentin. She notes that overall she is still in pain.  Not taking meloxicam daily. PRN gabapentin. We discussed using meloxicam daily for musculoskeletal issues and will repeat her creatinine in 3 months. ROS  Review of Systems   Constitutional: Negative for chills, fever and weight loss. HENT: Negative for congestion, ear discharge, ear pain, hearing loss and tinnitus. Eyes: Negative for blurred vision, double vision, photophobia and pain. Respiratory: Negative for cough and shortness of breath. Cardiovascular: Negative for chest pain, palpitations and leg swelling. Gastrointestinal: Negative for abdominal pain, nausea and vomiting. Genitourinary: Negative for dysuria, frequency, hematuria and urgency. Musculoskeletal: Positive for back pain, joint pain and neck pain. Negative for myalgias. Skin: Negative for itching and rash. Neurological: Negative. Endo/Heme/Allergies: Does not bruise/bleed easily. Psychiatric/Behavioral: Negative for depression. The patient is nervous/anxious. The patient does not have insomnia. Visit Vitals  /68 (BP 1 Location: Left arm, BP Patient Position: Sitting)   Pulse 83   Temp 98.1 °F (36.7 °C) (Oral)   Resp 16   Ht 5' 8\" (1.727 m)   Wt 172 lb (78 kg)   SpO2 98%   BMI 26.15 kg/m²       Physical Exam   Constitutional: She is oriented to person, place, and time. She appears well-developed and well-nourished. HENT:   Head: Normocephalic and atraumatic. Right Ear: External ear normal.   Left Ear: External ear normal.   Eyes: Pupils are equal, round, and reactive to light. EOM are normal.   Neck: Normal range of motion. Neck supple. No tracheal deviation present. No thyromegaly present. Cardiovascular: Normal rate and regular rhythm. No murmur heard. Pulmonary/Chest: Effort normal. No respiratory distress. Abdominal: Soft. Bowel sounds are normal. She exhibits no distension and no mass. There is no tenderness. There is no guarding. Neurological: She is alert and oriented to person, place, and time. Skin: Skin is warm and dry. Psychiatric: She has a normal mood and affect. Her behavior is normal.         Current Outpatient Medications   Medication Sig    meloxicam (MOBIC) 15 mg tablet TAKE ONE TABLET BY MOUTH DAILY WITH FOOD    eletriptan (RELPAX) 40 mg tablet Take 1 Tab by mouth once as needed for Pain for up to 1 dose. Indications: migraine headache    gabapentin (NEURONTIN) 300 mg capsule Take 300 mg by mouth three (3) times daily.  rizatriptan (MAXALT-MLT) 10 mg disintegrating tablet Take 1 Tab by mouth once as needed for Migraine for up to 1 dose.  cholecalciferol (VITAMIN D3) 1,000 unit cap Take  by mouth daily.  albuterol (PROVENTIL HFA, VENTOLIN HFA, PROAIR HFA) 90 mcg/actuation inhaler Take 1 Puff by inhalation every four (4) hours as needed for Wheezing.  multivitamin (ONE A DAY) tablet Take 1 Tab by mouth daily.  cetirizine (ZYRTEC) 10 mg tablet Take 10 mg by mouth daily. No current facility-administered medications for this visit.          Past Medical History:   Diagnosis Date    Allergic rhinitis, cause unspecified     Calculus of kidney     Calculus of kidney     Colon polyp 11/14/2016    Depression     Headache(784.0)     Hepatic cyst 9/7/2016    HX OTHER MEDICAL     migraines and kidney stones    Migraine       Past Surgical History:   Procedure Laterality Date    COLONOSCOPY N/A 11/14/2016    COLONOSCOPY WITH ENDOSCOPIC SUBMUCOSAL DISSECTION (ESD) performed by Amadou Beaulieu MD at [facility] Right 2016    neg;ductogram    HX BREAST BIOPSY Right 2016    Neg; 2 MRI bx after ductogram    HX BREAST BIOPSY Left 2016    neg; mri bx    HX COLONOSCOPY  2016    HX GYN      novasure procedure    HX HEENT      tonsils and adenoids - once as of 11/11/2016    HX HYSTERECTOMY  2016    HX OTHER SURGICAL      broken right arm x2 - no surgery    HX SKIN BIOPSY      HX TONSIL AND ADENOIDECTOMY  1978      Social History     Tobacco Use    Smoking status: Never Smoker    Smokeless tobacco: Never Used   Substance Use Topics    Alcohol use: Yes     Comment: 1-2 drinks per week      Family History   Problem Relation Age of Onset    Thyroid Disease Mother     Cancer Mother         skin - BCCA/squamous    Hypertension Father     Heart Disease Father     Other Father         ankylosing spondylitis    Elevated Lipids Father     Hypertension Paternal Grandmother     Elevated Lipids Paternal Grandmother     Breast Cancer Maternal Aunt 48    Cancer Maternal Aunt         breast    Heart Disease Paternal Grandfather         Allergies   Allergen Reactions    Latex Unknown (comments)    Lortab [Hydrocodone-Acetaminophen] Nausea and Vomiting     Nausea and vomiting    Adhesive Tape-Silicones Rash     With extended use of the tape.  Fioricet [Butalbital-Acetaminophen-Caff] Other (comments)     Insomnia. Pt does not recall being allergic to this.  Topamax [Topiramate] Other (comments)     Memory loss - short term memory. Assessment/Plan  Diagnoses and all orders for this visit:    1. Adjustment disorder, unspecified type -main issues with her relationship with her . Her  is refusing to get to see a therapist.  She notes that overall she feels more clear minded without the Zoloft. Notes that overall her mood is stable. Patient will let us know if this changes. I agree with continuing to urge her  to see a therapist with her. Over 50% of a visit of 25 minutes spent reviewing diagnosis and treatment options and side effects of medicines. 2. Migraine without aura and without status migrainosus, not intractable -PRN triptan. Patient notes that her migraines have been a bit more frequent but not interested in preventative treatment at this time. -     eletriptan (RELPAX) 40 mg tablet; Take 1 Tab by mouth once as needed for Pain for up to 1 dose.  Indications: migraine headache  -     rizatriptan (MAXALT-MLT) 10 mg disintegrating tablet; Take 1 Tab by mouth once as needed for Migraine for up to 1 dose. 3. DDD (degenerative disc disease), cervical -done with physical therapy. She takes NSAIDs but just as needed. We discussed taking daily meloxicam and then as needed gabapentin. We will refill this and see her back in 3 months to check her renal function  -     meloxicam (MOBIC) 15 mg tablet; TAKE ONE TABLET BY MOUTH DAILY WITH FOOD    4. Arthralgia, unspecified joint -multiple joints with mild pain. We will see how she does on the NSAID. Follow-up and Dispositions    · Return in about 3 months (around 8/8/2019). Mg Jara MD  5/8/2019    This note was created with the help of speech recognition software [employer]) and may contain some 'sound alike' errors.

## 2019-06-04 ENCOUNTER — TELEPHONE (OUTPATIENT)
Dept: INTERNAL MEDICINE CLINIC | Age: 49
End: 2019-06-04

## 2019-06-04 NOTE — TELEPHONE ENCOUNTER
----- Message from Lanette Noriega sent at 6/4/2019 12:47 PM EDT -----  Regarding: Dr. Kirk : [phone]  Pt donated blood and is having an allergic reaction in the area. Skin is itchy, tender and bumpy. Pt says it looks like poison ivy. No available appts. Tried backline, no answer. Pt was advised to seek appropriate medical care. She advised she was at [facility]. Best contact [phone].

## 2019-12-05 ENCOUNTER — HOSPITAL ENCOUNTER (OUTPATIENT)
Dept: MAMMOGRAPHY | Age: 49
Discharge: HOME | End: 2019-12-05
Attending: OBSTETRICS & GYNECOLOGY

## 2019-12-05 DIAGNOSIS — Z12.31 VISIT FOR SCREENING MAMMOGRAM: ICD-10-CM

## 2020-06-17 ENCOUNTER — HOSPITAL ENCOUNTER (OUTPATIENT)
Dept: MAMMOGRAPHY | Age: 50
Discharge: HOME | End: 2020-06-17
Attending: OBSTETRICS & GYNECOLOGY
Payer: COMMERCIAL

## 2020-06-17 PROCEDURE — 77067 SCR MAMMO BI INCL CAD: CPT

## 2020-09-14 DIAGNOSIS — G43.009 NONINTRACTABLE MIGRAINE, UNSPECIFIED MIGRAINE TYPE: ICD-10-CM

## 2020-09-14 RX ORDER — RIZATRIPTAN BENZOATE 10 MG/1
TABLET, ORALLY DISINTEGRATING ORAL
Qty: 9 TAB | Refills: 1 | Status: SHIPPED | OUTPATIENT
Start: 2020-09-14 | End: 2021-06-03 | Stop reason: SDUPTHER

## 2021-02-24 ENCOUNTER — TRANSCRIBE ORDER (OUTPATIENT)
Dept: SCHEDULING | Age: 51
End: 2021-02-24

## 2021-02-24 DIAGNOSIS — K76.9 LESION OF LIVER: ICD-10-CM

## 2021-02-24 DIAGNOSIS — R10.31 RIGHT LOWER QUADRANT PAIN: Primary | ICD-10-CM

## 2021-03-10 ENCOUNTER — NURSE TRIAGE (OUTPATIENT)
Dept: OTHER | Facility: CLINIC | Age: 51
End: 2021-03-10

## 2021-03-10 NOTE — TELEPHONE ENCOUNTER
has symptoms, has a test pending. Wife calling when she should get tested. Gave care advice. Wife asked a question regarding her husbands symptoms. He is not with her, I recommended she have her  call our line so we could triage him. I gave her our number. Reason for Disposition   COVID-19 Testing, questions about    Protocols used: CORONAVIRUS (COVID-19) EXPOSURE-ADULT-AH    Call [facility] on [place] [address] [facility]. Brief description of triage: question about when to get tested about COVID exposure. Triage indicates for patient to take care of her problem at home. Care advice provided. Recommended using local department of health website for testing locations and up to date information. Caller verbalizes understanding, denies any other questions or concerns; instructed to call back for any new or worsening symptoms.

## 2021-03-16 ENCOUNTER — TELEPHONE (OUTPATIENT)
Dept: NEUROLOGY | Age: 51
End: 2021-03-16

## 2021-03-16 ENCOUNTER — VIRTUAL VISIT (OUTPATIENT)
Dept: NEUROLOGY | Age: 51
End: 2021-03-16
Payer: COMMERCIAL

## 2021-03-16 DIAGNOSIS — G43.109 MIGRAINE WITH AURA AND WITHOUT STATUS MIGRAINOSUS, NOT INTRACTABLE: Primary | ICD-10-CM

## 2021-03-16 DIAGNOSIS — R51.9 CHRONIC DAILY HEADACHE: ICD-10-CM

## 2021-03-16 DIAGNOSIS — R06.83 SNORING: ICD-10-CM

## 2021-03-16 PROCEDURE — 99204 OFFICE O/P NEW MOD 45 MIN: CPT | Performed by: PSYCHIATRY & NEUROLOGY

## 2021-03-16 NOTE — PATIENT INSTRUCTIONS
PRESCRIPTION REFILL POLICY [facility] Statement to Patients April 1, 2014 In an effort to ensure the large volume of patient prescription refills is processed in the most efficient and expeditious manner, we are asking our patients to assist us by calling your Pharmacy for all prescription refills, this will include also your  Mail Order Pharmacy. The pharmacy will contact our office electronically to continue the refill process. Please do not wait until the last minute to call your pharmacy. We need at least 48 hours (2days) to fill prescriptions. We also encourage you to call your pharmacy before going to  your prescription to make sure it is ready. With regard to controlled substance prescription refill requests (narcotic refills) that need to be picked up at our office, we ask your cooperation by providing us with at least 72 hours (3days) notice that you will need a refill. We will not refill narcotic prescription refill requests after 4:00pm on any weekday, Monday through Thursday, or after 2:00pm on Fridays, or on the weekends. We encourage everyone to explore another way of getting your prescription refill request processed using Home[employer], our patient web portal through our electronic medical record system. HomeRun is an efficient and effective way to communicate your medication request directly to the office and  downloadable as an eren on your smart phone . [employer]Run also features a review functionality that allows you to view your medication list as well as leave messages for your physician. Are you ready to get connected? If so please review the attatched instructions or speak to any of our staff to get you set up right away! Thank you so much for your cooperation. Should you have any questions please contact our Practice Administrator. The Physicians and Staff,  [facility]

## 2021-03-16 NOTE — TELEPHONE ENCOUNTER
Called patient to schedule follow up with  in 2 months, no answer. Left message to call back to schedule.

## 2021-03-16 NOTE — PROGRESS NOTES
NEUROLOGY  NEW PATIENT EVALUATION/CONSULTATION       PATIENT NAME: Angelito Elliott    MRN: [id number]    REASON FOR CONSULTATION: Headaches    03/16/21    This is a telemedicine visit that was performed with the originating site at [address] [place] [address] and the distant site at patient's home. Verbal consent to participate in video visit was obtained. The patient was identified by name and date of birth. This visit occurred during the Coronavirus [phone]) [facility] Emergency. I discussed with the patient the nature of our telemedicine visits, that:     I would evaluate the patient and recommend diagnostics and treatments based on my assessment   Our sessions are not being recorded and that personal health information is protected   Our team would provide follow up care in person if/when the patient needs it    Previous records (physician notes, laboratory reports, and radiology reports) and imaging studies were reviewed and summarized. My recommendations will be communicated back to the patient's physician(s) via electronic medical record and/or by [address] [facility] mail. HISTORY OF PRESENT ILLNESS:  Angelito Elliott is a 53 y.o.  female presenting for evaluation of headaches. She is seen via telemedicine visit today due to recent COVID exposure. She was most recently followed by Dr. Trevor Diaz, last seen 1/3/18. H/O headaches since childhood. Location: Bi-temporal, occasionally unilateral  Character: Pulsating/throbbing  Intensity: On average 7/10  Frequency: severe headaches once monthly, chronic daily headaches upon awakening  # HA free days per month: 0  Duration: Up to 48h for more severe headaches,  Aura: Yes, scotomas  Associated Sx with HA: +nausea/vomiting, +phonophotophobia.   Denies unilateral ptosis, conjunctival injection, lacrimation, sweating  Neurological ROS: Denies focal weakness, numbness or vision loss associated with headaches  Systemic ROS:   +H/O snoring   Caffeine use: 2 cups daily  H/O Head trauma: skull fracture as kid (fell out of bed)    Any change in pattern of HA? None    Triggers: Inadequate sleep, stress, smoke. No worsening reported with cough/sneeze, bending over  Alleviating factors: Cold compress  FHx HA/migraine:  Mother    Treatment so far: Maxalt PRN or BC powder for less severe headache 2x weekly  Prior rescue: Imitrex (ineffective)  Prior preventatives: TPM (side effects), Zoloft (ineffective), Betablockers (side effects)  Investigations so far: None    PAST MEDICAL HISTORY:  Past Medical History:   Diagnosis Date    Allergic rhinitis, cause unspecified     Calculus of kidney     Calculus of kidney     Colon polyp 11/14/2016    Depression     Headache(784.0)     Hepatic cyst 9/7/2016    HX OTHER MEDICAL     migraines and kidney stones    Migraine        PAST SURGICAL HISTORY:  Past Surgical History:   Procedure Laterality Date    COLONOSCOPY N/A 11/14/2016    COLONOSCOPY WITH ENDOSCOPIC SUBMUCOSAL DISSECTION (ESD) performed by Dylan Gama MD at [address] HX BREAST BIOPSY Right 2016    neg;ductogram    HX BREAST BIOPSY Right 2016    Neg; 2 MRI bx after ductogram    HX BREAST BIOPSY Left 2016    neg; mri bx    HX COLONOSCOPY  2016    HX GYN      novasure procedure    HX HEENT      tonsils and adenoids - once as of 11/11/2016    HX HYSTERECTOMY  2016    HX OTHER SURGICAL      broken right arm x2 - no surgery    HX SKIN BIOPSY      HX TONSIL AND ADENOIDECTOMY  1978       FAMILY HISTORY:   Family History   Problem Relation Age of Onset    Thyroid Disease Mother     Cancer Mother         skin - BCCA/squamous    Hypertension Father     Heart Disease Father     Other Father         ankylosing spondylitis    Elevated Lipids Father     Hypertension Paternal Grandmother     Elevated Lipids Paternal Grandmother     Breast Cancer Maternal Aunt 48    Cancer Maternal Aunt         breast    Heart Disease Paternal Grandfather SOCIAL HISTORY:  Social History     Socioeconomic History    Marital status:      Spouse name: Nagi Morales ()    Number of children: Not on file    Years of education: Not on file    Highest education level: Not on file   Occupational History    Occupation: Homemaker   Tobacco Use    Smoking status: Never Smoker    Smokeless tobacco: Never Used   Substance and Sexual Activity    Alcohol use: Yes     Comment: 1-2 drinks per week    Drug use: No    Sexual activity: Yes     Partners: Male     Birth control/protection: None         MEDICATIONS:   Current Outpatient Medications   Medication Sig Dispense Refill    aspirin/salicylamide/caffeine (BC HEADACHE POWDER PO) Take  by mouth.  QUERCETIN DIHYDRATE, BULK, by Does Not Apply route.  rizatriptan (MAXALT-MLT) 10 mg disintegrating tablet TAKE ONE TABLET BY MOUTH AT ONSET OF MIGRAINE AS NEEDED **DO NOT TAKE MORE THAN 1 TABLET PER DAY** 9 Tab 1    cholecalciferol (VITAMIN D3) 1,000 unit cap Take  by mouth daily.  albuterol (PROVENTIL HFA, VENTOLIN HFA, PROAIR HFA) 90 mcg/actuation inhaler Take 1 Puff by inhalation every four (4) hours as needed for Wheezing. 1 Inhaler 0    multivitamin (ONE A DAY) tablet Take 1 Tab by mouth daily.  cetirizine (ZYRTEC) 10 mg tablet Take 10 mg by mouth daily. ALLERGIES:  Allergies   Allergen Reactions    Latex Unknown (comments)    Lortab [Hydrocodone-Acetaminophen] Nausea and Vomiting     Nausea and vomiting    Adhesive Tape-Silicones Rash     With extended use of the tape.  Fioricet [Butalbital-Acetaminophen-Caff] Other (comments)     Insomnia. Pt does not recall being allergic to this.  Topamax [Topiramate] Other (comments)     Memory loss - short term memory. REVIEW OF SYSTEMS:  10 point ROS reviewed with patient. Please see scanned document under media.        PHYSICAL EXAM:  Due to this being a TeleHealth evaluation, many elements of the physical examination are unable to be assessed. No flowsheet data found. Exam:  NEUROLOGICAL EXAM:  General: Awake, alert, speech fluent  CN: EOMI, VF intact, facial strength/sensation normal and symmetric, hearing is intact  Motor: AG x 4  Reflexes: deferred  Coordination: No ataxia. Sensation: LT intact throughout  Gait: Steady    ASSESSMENT:      ICD-10-CM ICD-9-CM    1. Migraine with aura and without status migrainosus, not intractable  G43.109 346.00    2. Chronic daily headache  R51.9 784.0    3. Snoring  R06.83 66.09    53year old female presenting for evaluation of chronic daily headaches and migraines w/aura since childhood. Headaches have been refractory to betablockers, TPM IR/ER, SSRIs previously. Neurological examination appears non-focal. Will proceed with polysomnogram to assess for AMARI potentially contributing to AM headaches due to h/o snoring. Discussed options for headache prophylaxis including TCA therapy, CGRP inhibitors and Botox injections in detail including potential side effects. She will review these in further detail and let us know which preventative therapy she would like to pursue. Headache education  Discussed pathophysiology of headache. Discussed treatment options, both abortive and preventive medications. Instructed patient about medications and potential side effects. PLAN:  · PSG  · Discussed options for migraine/CDH prophylaxis including TCA therapy, CGRP inhibitors and botox injections. Reviewed risks/benefits/potential side effects in detail. She will call regarding which therapy she wishes to pursue. Follow-up and Dispositions    · Return in about 2 months (around 5/16/2021). I have discussed the diagnosis with the patient today and the intended plan as seen in the above orders with both the patient as well as referring provider and/or PCP via electronic correspondence.  The patient has received an after-visit summary and questions were answered concerning future plans. I have discussed medication side effects and warnings with the patient as well. Jose G Gonzalez, DO  Staff Neurologist  Diplomate, [facility] [employer] of Psychiat[employer] & Neurology

## 2021-03-24 ENCOUNTER — HOSPITAL ENCOUNTER (OUTPATIENT)
Dept: CT IMAGING | Age: 51
Discharge: HOME | End: 2021-03-24
Attending: PHYSICIAN ASSISTANT
Payer: COMMERCIAL

## 2021-03-24 DIAGNOSIS — R10.31 RIGHT LOWER QUADRANT PAIN: ICD-10-CM

## 2021-03-24 DIAGNOSIS — K76.9 LESION OF LIVER: ICD-10-CM

## 2021-03-24 PROCEDURE — 74178 CT ABD&PLV WO CNTR FLWD CNTR: CPT

## 2021-03-24 PROCEDURE — 74011000636 HC RX REV CODE- 636: Performed by: PHYSICIAN ASSISTANT

## 2021-03-24 RX ADMIN — IOPAMIDOL 100 ML: 755 INJECTION, SOLUTION INTRAVENOUS at 09:23

## 2021-04-14 ENCOUNTER — OFFICE VISIT (OUTPATIENT)
Dept: SLEEP MEDICINE | Age: 51
End: 2021-04-14
Payer: COMMERCIAL

## 2021-04-14 ENCOUNTER — DOCUMENTATION ONLY (OUTPATIENT)
Dept: SLEEP MEDICINE | Age: 51
End: 2021-04-14

## 2021-04-14 VITALS
WEIGHT: 173 LBS | OXYGEN SATURATION: 97 % | TEMPERATURE: 98.9 F | HEART RATE: 83 BPM | SYSTOLIC BLOOD PRESSURE: 101 MMHG | HEIGHT: 68 IN | BODY MASS INDEX: 26.22 KG/M2 | DIASTOLIC BLOOD PRESSURE: 70 MMHG

## 2021-04-14 DIAGNOSIS — G47.33 OSA (OBSTRUCTIVE SLEEP APNEA): Primary | ICD-10-CM

## 2021-04-14 DIAGNOSIS — R51.9 DAILY HEADACHE: ICD-10-CM

## 2021-04-14 PROCEDURE — 99214 OFFICE O/P EST MOD 30 MIN: CPT | Performed by: SPECIALIST

## 2021-04-14 NOTE — PROGRESS NOTES
Called [employer] and used automated system for authorization. No auth required for HSAT.  Ref#: [id number]

## 2021-04-14 NOTE — PATIENT INSTRUCTIONS
Learning About Sleep Apnea  What is it? Sleep apnea means that breathing stops for short periods during sleep. When you stop breathing or have reduced airflow into your lungs during sleep, you don't sleep well and you can be very tired during the day. The oxygen levels in your blood may go down, and carbon dioxide levels go up. It may lead to other problems, such as high blood pressure and heart disease. Sleep apnea can range from mild to severe, based on how often breathing stops during sleep. Breathing may stop as few as 5 times an hour (mild apnea) to 30 or more times an hour (severe apnea). Obstructive sleep apnea is the most common type. This most often occurs because your airways are blocked or partly blocked. Central sleep apnea is less common. It happens when the brain has trouble controlling breathing. Some people have both types. That's called complex sleep apnea. What are the symptoms? There are symptoms of sleep apnea that you may notice and symptoms that others may notice when you're asleep. Symptoms you may notice include:  · Feeling extremely sleepy during the day. · Feeling unrefreshed or tired after a night's sleep. · Problems with memory and concentration, or mood changes. · Morning headaches. · Getting up often during the night to urinate. · A dry mouth or sore throat in the morning. Your bed partner may notice that you:  · Have episodes of not breathing. · Snore loudly. Almost all people who have sleep apnea snore. But not all people who snore have sleep apnea. · Toss and turn during sleep. · Have nighttime choking or gasping spells. How is it diagnosed? Your doctor will probably do a physical exam and ask about your past health. The doctor may also ask you or your bed partner about your snoring and sleep behavior and how tired you feel during the day. Your doctor may suggest a sleep study.  Sleep studies are a series of tests that look at what happens to the body during sleep. They check for how often you stop breathing or have too little air flowing into your lungs during sleep. They also find out how much oxygen you have in your blood during sleep. A sleep study may take place in your home. Or it might take place at a sleep center, where you will spend the night. If your sleep apnea doesn't improve with treatment, you may have more tests to find out what's causing it. How is it treated? You may be able to help treat sleep apnea by making some lifestyle changes. You could try to lose weight, sleep on your side, and avoid alcohol and medicines like sedatives before bed. Sleep apnea is often treated with machines that deliver air through a mask to help keep your airways open. These include:  · Continuous positive airway pressure (CPAP). This increases air pressure in your throat. It keeps your airway open when you breathe in. It's the most common device. · Bilevel positive airway pressure (BiPAP). This uses different air pressures when you breathe in and out. · Adaptive servo ventilation (ASV). It senses pauses in breathing and adjusts air pressure. It's mostly used for central sleep apnea. You can also try oral breathing devices or nasal devices. If your tonsils or other tissues are blocking your airway, your doctor may suggest surgery to open the airway. How can you care for yourself at home? · Lose weight, if needed. · Sleep on your side. It may help mild apnea. · Avoid alcohol and medicines such as sleeping pills, opioids, or sedatives before bed. · Don't smoke. If you need help quitting, talk to your doctor. · Prop up the head of your bed. · Treat breathing problems, such as a stuffy nose, that are caused by a cold or allergies. · Try a continuous positive airway pressure (CPAP) breathing machine if your doctor recommends it. · If CPAP doesn't work for you, ask your doctor if you can try other masks, settings, or breathing machines.   · Try oral breathing devices or other nasal devices. · Talk to your doctor if your nose feels dry or bleeds, or if it gets runny or stuffy when you use a breathing machine. · Tell your doctor if you're sleepy during the day and it affects your daily life. Don't drive or operate machinery when you're drowsy. Where can you learn more? Go to http://www.gray.com/  Enter S121 in the search box to learn more about \"Learning About Sleep Apnea. \"  Current as of: October 26, 2020               Content Version: 12.8  © 8107-0789 [employer]. Care instructions adapted under license by [employer] (which disclaims liability or warranty for this information). If you have questions about a medical condition or this instruction, always ask your healthcare professional. [address] any warranty or liability for your use of this information.

## 2021-04-14 NOTE — PROGRESS NOTES
[address] [place]. [place], [address]  Tel.  [phone]  Fax. [address] [facility] [address]  [place], [address] [facility]  Tel.  [phone]  Fax. [phone] [address] [facility] Lesley Torrez   Tel.  [phone]  Fax. [phone]       Chief Complaint       No chief complaint on file. BRUNILDA Frausto is 53 y.o. female seen for evaluation of a sleep disorder. She has a history of daily headache more prominent in the morning. She notes history of snoring described as loud; can be heard in separate bedrooms. She normally retires at 5: 30 p.m. and awaken between 4: 30-5 AM.  She will not awaken frequently throughout the night. She notes sleep talking/sleepwalking, sitting up in bed while still asleep or getting out of bed while still asleep. She notes vivid dreams. She denies bruxism, nocturnal incontinence, hypnagogic hallucinations, sleep paralysis or cataplexy. She does not report excessive daytime fatigue. The patient has not undergone diagnostic testing for the current problems. Allergies   Allergen Reactions    Latex Unknown (comments)    Lortab [Hydrocodone-Acetaminophen] Nausea and Vomiting     Nausea and vomiting    Adhesive Tape-Silicones Rash     With extended use of the tape.  Fioricet [Butalbital-Acetaminophen-Caff] Other (comments)     Insomnia. Pt does not recall being allergic to this.  Topamax [Topiramate] Other (comments)     Memory loss - short term memory. Current Outpatient Medications   Medication Sig Dispense Refill    aspirin/salicylamide/caffeine (BC HEADACHE POWDER PO) Take  by mouth.  QUERCETIN DIHYDRATE, BULK, by Does Not Apply route.  rizatriptan (MAXALT-MLT) 10 mg disintegrating tablet TAKE ONE TABLET BY MOUTH AT ONSET OF MIGRAINE AS NEEDED **DO NOT TAKE MORE THAN 1 TABLET PER DAY** 9 Tab 1    cholecalciferol (VITAMIN D3) 1,000 unit cap Take  by mouth daily.       albuterol (PROVENTIL HFA, VENTOLIN HFA, PROAIR HFA) 90 mcg/actuation inhaler Take 1 Puff by inhalation every four (4) hours as needed for Wheezing. 1 Inhaler 0    multivitamin (ONE A DAY) tablet Take 1 Tab by mouth daily.  cetirizine (ZYRTEC) 10 mg tablet Take 10 mg by mouth daily. She  has a past medical history of Allergic rhinitis, cause unspecified, Calculus of kidney, Calculus of kidney, Colon polyp (11/14/2016), Depression, Headache(784.0), Hepatic cyst (9/7/2016), OTHER MEDICAL, and Migraine. She also has no past medical history of Abnormal Pap smear, Acquired hypothyroidism, Adverse effect of anesthesia, Anemia NEC, Asthma, Complication of anesthesia, Diabetes mellitus, Essential hypertension, Heart abnormalities, Herpes simplex without mention of complication, Human immunodeficiency virus (HIV) disease ([facility] Infertility, Phlebitis and thrombophlebitis of unspecified site, Postpartum depression, Psychiatric problem, Rhesus isoimmunization unspecified as to episode of care in pregnancy, Sickle-cell disease, unspecified, Sleep apnea, Systemic lupus erythematosus ([facility] Trauma, Unspecified breast disorder, Unspecified diseases of blood and blood-forming organs, or Unspecified epilepsy without mention of intractable epilepsy. She  has a past surgical history that includes hx tonsil and adenoidectomy (1978); hx colonoscopy (2016); hx other surgical; hx heent; colonoscopy (N/A, 11/14/2016); hx gyn; hx hysterectomy (2016); hx skin biopsy; hx breast biopsy (Right, 2016); hx breast biopsy (Right, 2016); and hx breast biopsy (Left, 2016). She family history includes Breast Cancer (age of onset: 48) in her maternal aunt; Cancer in her maternal aunt and mother; Elevated Lipids in her father and paternal grandmother; Heart Disease in her father and paternal grandfather; Hypertension in her father and paternal grandmother; Other in her father; Thyroid Disease in her mother.     She  reports that she has never smoked. She has never used smokeless tobacco. She reports current alcohol use. She reports that she does not use drugs. Review of Systems:  Review of Systems   Constitutional: Negative for chills and fever. HENT: Negative for hearing loss and tinnitus. Eyes: Negative for blurred vision and double vision. Respiratory: Negative for cough and shortness of breath. Cardiovascular: Negative for chest pain and palpitations. Gastrointestinal: Negative for abdominal pain and heartburn. Genitourinary: Negative for frequency and urgency. Musculoskeletal: Negative for back pain and neck pain. Skin: Negative for itching and rash. Neurological: Positive for headaches. Psychiatric/Behavioral: The patient is nervous/anxious. Objective:     Visit Vitals  /70 (BP 1 Location: Left upper arm, BP Patient Position: Sitting)   Pulse 83   Temp 98.9 °F (37.2 °C) (Temporal)   Ht 5' 8\" (1.727 m)   Wt 173 lb (78.5 kg)   LMP 11/02/2016   SpO2 97%   BMI 26.30 kg/m²     Body mass index is 26.3 kg/m². General:   Conversant, cooperative   Eyes:  Pupils equal and reactive, no nystagmus   Oropharynx:   Mallampati score I, tongue normal       Neck:   No carotid bruits; Chest/Lungs:  Clear on auscultation    CVS:  Normal rate, regular rhythm   Skin:  Warm to touch; no obvious rashes   Neuro:  Speech fluent, face symmetrical, tongue movement normal   Psych:  Normal affect,  normal countenance        Assessment:       ICD-10-CM ICD-9-CM    1. AMARI (obstructive sleep apnea)  G47.33 327.23 SLEEP STUDY UNATTENDED, 4 CHANNEL   2. Daily headache  R51.9 784.0        Potential sleep disordered breathing. She will be evaluated with a home sleep test.  Results to be reviewed with her.     Plan:     Orders Placed This Encounter    SLEEP STUDY UNATTENDED, 4 CHANNEL     Order Specific Question:   Reason for Exam     Answer:   snoring       * Patient has a history and examination consistent with the diagnosis of sleep apnea. *Home sleep testing was ordered for initial evaluation. * She was provided information on sleep apnea including corresponding risk factors and the importance of proper treatment. * Treatment options if indicated were reviewed today. Instructions:  o Do not engage in activities requiring a normal degree of alertness if fatigue is present. o The patient understands that untreated or undertreated sleep apnea could impair judgement and the ability to function normally during the day.  o Call or return if symptoms worsen or persist.          Prosper Up MD, FAA  Electronically signed 04/14/21       This note was created using voice recognition software. Despite editing, there may be syntax errors. This note will not be viewable in [address]

## 2021-04-15 ENCOUNTER — HOSPITAL ENCOUNTER (OUTPATIENT)
Dept: SLEEP MEDICINE | Age: 51
Discharge: HOME | End: 2021-04-15
Payer: COMMERCIAL

## 2021-04-15 ENCOUNTER — OFFICE VISIT (OUTPATIENT)
Dept: SLEEP MEDICINE | Age: 51
End: 2021-04-15

## 2021-04-15 DIAGNOSIS — G47.33 OSA (OBSTRUCTIVE SLEEP APNEA): Primary | ICD-10-CM

## 2021-04-15 PROCEDURE — 95806 SLEEP STUDY UNATT&RESP EFFT: CPT | Performed by: SPECIALIST

## 2021-04-15 NOTE — PROGRESS NOTES
[address] [place]. [place], [address]  Tel.  [phone]  Fax. 100 [facility] [address]  [place], [address] [facility]  Tel.  [phone]  Fax. [phone] [address] [facility]   Tel.  [phone]  Fax. [phone]       S>Dee Jennings is a 53 y.o. female seen today to receive a home sleep testing unit (HST). · Patient was educated on proper hookup and operation of the HST. · Instruction forms and documentation were reviewed and signed. · The patient demonstrated good understanding of the HST. · Serial [id number]. O>    Visit Vitals  LMP 11/02/2016         A>  1. AMARI (obstructive sleep apnea)          P>  · General information regarding operations and maintenance of the device was provided. · She was provided information on sleep apnea including coresponding risk factors and the importance of proper treatment. · Follow-up appointment was made to return the HST. She will be contacted once the results have been reviewed. · She was asked to contact our office for any problems regarding her home sleep test study.

## 2021-04-21 RX ORDER — FREMANEZUMAB-VFRM 225 MG/1.5ML
225 INJECTION SUBCUTANEOUS
Qty: 1 SYRINGE | Refills: 2 | Status: SHIPPED | OUTPATIENT
Start: 2021-04-21 | End: 2021-07-22 | Stop reason: SDUPTHER

## 2021-04-26 ENCOUNTER — TELEPHONE (OUTPATIENT)
Dept: SLEEP MEDICINE | Age: 51
End: 2021-04-26

## 2021-04-26 DIAGNOSIS — G47.33 OSA (OBSTRUCTIVE SLEEP APNEA): Primary | ICD-10-CM

## 2021-04-26 NOTE — TELEPHONE ENCOUNTER
HSAT performed for potential sleep disordered breathing. Overall AHI mild at 5.5/h associated with minimal SaO2 of 89%. Events primarily supine with supine-related AHI of 6.5/h. Snoring during 4.3% of the study. Events periodic suggestive of potential REM-related episodes. Potentially contributing to patient's history of headache on awakening. Recommendation: APAP 5-15 cm. Sleep technologist: Please review the HSAT results with the patient. APAP 5-15 cm order entered. Patient has history of headache on awakening. Other treatment options (i.e. oral appliance) may be considered if headaches improved following APAP initiation.

## 2021-04-29 NOTE — TELEPHONE ENCOUNTER
Reviewed sleep study results with patient. She expressed understanding. Patient would like to think about her options and call back with her decision on how to move forward.

## 2021-04-30 ENCOUNTER — TELEPHONE (OUTPATIENT)
Dept: NEUROLOGY | Age: 51
End: 2021-04-30

## 2021-04-30 NOTE — TELEPHONE ENCOUNTER
----- Message from Monique Mittal sent at 4/30/2021 11:38 AM EDT -----  Regarding: Dr. aSmmy Ortiz  Patient return call    Caller's first and last name and relationship (if not the patient):  PT      Best contact number(s):  [phone]      Whose call is being returned:  Naomi Jacques maximo called received moments ago      Details to clarify the request:  Concerning scheduling  for Nurse's visit for self injections      Monique Mittal

## 2021-04-30 NOTE — TELEPHONE ENCOUNTER
Left message for patient,  would like for her to have a sample of medication.  To call back when she can come by the office to

## 2021-05-11 ENCOUNTER — TELEPHONE (OUTPATIENT)
Dept: INTERNAL MEDICINE CLINIC | Age: 51
End: 2021-05-11

## 2021-05-11 NOTE — TELEPHONE ENCOUNTER
Pt is requesting to speak to the nurse regarding her covid test.      Please call her home number. Pt is leaving on May 13 going out of town.     Thank you

## 2021-05-11 NOTE — TELEPHONE ENCOUNTER
We do not do that here- I just spoke with Dr Jacquelin Escoto and he states that [employer] is not doing PCR testing which is what you would need- so he can not order it. HE states the best place to go would be Urgent care or local pharmacies that are doing the nasal swab PCR send out testing. We are only able to order blood anti-body testing which is not what is needed for traveling. Can you let them know that? I My charted her also.

## 2021-05-12 NOTE — TELEPHONE ENCOUNTER
PSR returned patient's call to advise of nurse/PCP's note. Patient very thankful for return call and information.

## 2021-05-24 ENCOUNTER — TRANSCRIBE ORDER (OUTPATIENT)
Dept: SCHEDULING | Age: 51
End: 2021-05-24

## 2021-05-24 DIAGNOSIS — Z12.31 VISIT FOR SCREENING MAMMOGRAM: Primary | ICD-10-CM

## 2021-05-25 ENCOUNTER — OFFICE VISIT (OUTPATIENT)
Dept: NEUROLOGY | Age: 51
End: 2021-05-25
Payer: COMMERCIAL

## 2021-05-25 VITALS
DIASTOLIC BLOOD PRESSURE: 66 MMHG | HEART RATE: 73 BPM | RESPIRATION RATE: 18 BRPM | OXYGEN SATURATION: 98 % | SYSTOLIC BLOOD PRESSURE: 102 MMHG

## 2021-05-25 DIAGNOSIS — R51.9 CHRONIC DAILY HEADACHE: ICD-10-CM

## 2021-05-25 DIAGNOSIS — G43.109 MIGRAINE WITH AURA AND WITHOUT STATUS MIGRAINOSUS, NOT INTRACTABLE: ICD-10-CM

## 2021-05-25 DIAGNOSIS — R43.1 OLFACTORY AURA: Primary | ICD-10-CM

## 2021-05-25 PROCEDURE — 99214 OFFICE O/P EST MOD 30 MIN: CPT | Performed by: PSYCHIATRY & NEUROLOGY

## 2021-05-25 NOTE — PROGRESS NOTES
Neurology Clinic Follow up Note    Patient ID:  Rajesh Beavers  [id number]  89 y.o.  1970      Ms. Sergio Garner is here for follow up today of  Chief Complaint   Patient presents with    Neurologic Problem          Last Appointment With Me:  3/16/2021       Interval History:   Patient reports a new concern regarding olfactory aura. She smells smoke \"like a ashtray\" over the past year intermittently with increasing frequency since regaining her sense of smell post COVID. No associated AMS or h/o seizures. She is still experiencing mild headaches typically upon awakening daily. PSG abnormal-now using a mouth piece but recommendations for CPAP. She endorses more rare migraines once monthly on average. She started Ajovy injections recently-tolerated injections well. Using Maxalt for rescue therapy without significant relief. PMHx/ PSHx/ FHx/ SHx:  Reviewed and unchanged previous visit. Past Medical History:   Diagnosis Date    Allergic rhinitis, cause unspecified     Calculus of kidney     Calculus of kidney     Colon polyp 11/14/2016    Depression     Headache(784.0)     Hepatic cyst 9/7/2016    HX OTHER MEDICAL     migraines and kidney stones    Migraine          ROS:  Comprehensive review of systems negative except for as noted above. Objective:       Meds:  Current Outpatient Medications   Medication Sig Dispense Refill    fremanezumab-vfrm (Ajovy Autoinjector) 225 mg/1.5 mL auto-injector 1.5 mL by SubCUTAneous route every month. 1 Syringe 2    aspirin/salicylamide/caffeine (BC HEADACHE POWDER PO) Take  by mouth.  QUERCETIN DIHYDRATE, BULK, by Does Not Apply route.  rizatriptan (MAXALT-MLT) 10 mg disintegrating tablet TAKE ONE TABLET BY MOUTH AT ONSET OF MIGRAINE AS NEEDED **DO NOT TAKE MORE THAN 1 TABLET PER DAY** 9 Tab 1    cholecalciferol (VITAMIN D3) 1,000 unit cap Take  by mouth daily.       albuterol (PROVENTIL HFA, VENTOLIN HFA, PROAIR HFA) 90 mcg/actuation inhaler Take 1 Puff by inhalation every four (4) hours as needed for Wheezing. 1 Inhaler 0    multivitamin (ONE A DAY) tablet Take 1 Tab by mouth daily.  cetirizine (ZYRTEC) 10 mg tablet Take 10 mg by mouth daily. Exam:  Visit Vitals  /66   Pulse 73   Resp 18   LMP 11/02/2016   SpO2 98%     NEUROLOGICAL EXAM:  General: Awake, alert, speech fluent  CN: PERRL, EOMI without nystagmus, VFF to confrontation, facial sensation and strength are normal and symmetric, hearing is intact to finger rub bilaterally, palate and tongue movements are intact and symmetric. Motor: Normal tone, bulk and strength bilaterally. Reflexes: 1/4 and symmetric, plantar stimulation is flexor. Coordination: FNF, ALEXIA, HTS intact. Sensation: LT intact throughout. Gait: Normal-based and steady. Lab data was reviewed. LABS  Results for orders placed or performed in visit on 05/30/18   LYME AB, IGG & IGM BY WB   Result Value Ref Range    IgG P93 Ab Present (A)     IgG P66 Ab Absent     IgG P58 Ab Absent     IgG P45 Ab Absent     IgG P41 Ab Present (A)     IgG P39 Ab Absent     IgG P30 Ab Absent     IgG P28 Ab Absent     IgG P23 Ab Absent     IgG P18 Ab Absent     Lyme Ab, IgG WB Interp. Negative     IgM P41 Ab Absent     IgM P39 Ab Absent     IgM P23 Ab Absent     Lyme Ab, IgM WB Interp. Negative    VITAMIN B12 & FOLATE   Result Value Ref Range    Vitamin B12 773 232 - 1,245 pg/mL    Folate 18.5 >3.0 ng/mL   TSH 3RD GENERATION   Result Value Ref Range    TSH 0.944 0.450 - 4.500 uIU/mL       IMAGING:  MRI Results (most recent):  Results from Abstract encounter on 03/16/17    MRI GUIDE BREAST BX LT          Assessment:     Encounter Diagnoses     ICD-10-CM ICD-9-CM   1. Olfactory aura  R43.1 781.1   2. Migraine with aura and without status migrainosus, not intractable  G43.109 346.00   3. Chronic daily headache  R51.9 66.0      53year old female previously followed for chronic daily headaches and migraines w/aura since childhood. Headaches have been refractory to betablockers, TPM IR/ER, SSRIs previously. PSG completed with recommendations for CPAP. She is presently using a mouth guard. Encouraged f/u with sleep medicine as this may be contributing to persistent chronic daily headaches. She will continue with Ajovy injections for migraine prophylaxis. Will transition to Nurtec PRN for migraine rescue therapy due to ineffectiveness with previous triptan therapy. Lastly, she reports olfactory aura over the past year with increasing frequency. No associated AMS, seizure-like activity or h/o seizures. Will proceed with neuroimaging to exclude intracranial pathology and EEG to exclude epileptiform activity potentially contributing to her aura. If these are unrevealing, we discussed possibility of aura associated with migraines. Headache education  Discussed pathophysiology of headache. Discussed treatment options, both abortive and preventive medications. Instructed patient about medications and potential side effects. Plan:   MRI Brain WWO  Routine EEG  Continue Ajovy injections for migraine prophylaxis   Trial of Nurtec PRN for migraine rescue therapy   Continue mouth guard for AMARI-f/u with sleep medicine    I have discussed the diagnosis with the patient today and the intended plan as seen in the above orders with both the patient as well as referring provider and/or PCP via electronic correspondence. The patient has received an after-visit summary and questions were answered concerning future plans. I have discussed medication side effects and warnings with the patient as well.       Signed:  Beth Basilio DO  5/25/2021  8:26 AM

## 2021-05-25 NOTE — PATIENT INSTRUCTIONS
[facility]   Statement to Patients  April 1, 2014      In an effort to ensure the large volume of patient prescription refills is processed in the most efficient and expeditious manner, we are asking our patients to assist us by calling your Pharmacy for all prescription refills, this will include also your  Mail Order Pharmacy. The pharmacy will contact our office electronically to continue the refill process. Please do not wait until the last minute to call your pharmacy. We need at least 48 hours (2days) to fill prescriptions. We also encourage you to call your pharmacy before going to  your prescription to make sure it is ready. With regard to controlled substance prescription refill requests (narcotic refills) that need to be picked up at our office, we ask your cooperation by providing us with at least 72 hours (3days) notice that you will need a refill. We will not refill narcotic prescription refill requests after 4:00pm on any weekday, Monday through Thursday, or after 2:00pm on Fridays, or on the weekends. We encourage everyone to explore another way of getting your prescription refill request processed using [employer], our patient web portal through our electronic medical record system. [employer] is an efficient and effective way to communicate your medication request directly to the office and  downloadable as an eren on your smart phone . [employer] also features a review functionality that allows you to view your medication list as well as leave messages for your physician. Are you ready to get connected? If so please review the attatched instructions or speak to any of our staff to get you set up right away! Thank you so much for your cooperation. Should you have any questions please contact our Practice Administrator.     The Physicians and Staff,  [facility]

## 2021-06-01 ENCOUNTER — TELEPHONE (OUTPATIENT)
Dept: NEUROLOGY | Age: 51
End: 2021-06-01

## 2021-06-02 NOTE — TELEPHONE ENCOUNTER
Rcvd clinical questions and received PA approval    Effective 05/03/2021-06/02/2022. Faxed update to Annabelle Mendoza [address], [place]   Phone:  [phone]  Fax:  [phone]     Fax conf to chart.     Sent update to nurse to have medication added back to current med list.

## 2021-06-03 ENCOUNTER — OFFICE VISIT (OUTPATIENT)
Dept: INTERNAL MEDICINE CLINIC | Age: 51
End: 2021-06-03
Payer: COMMERCIAL

## 2021-06-03 VITALS
HEIGHT: 68 IN | TEMPERATURE: 98.2 F | HEART RATE: 66 BPM | OXYGEN SATURATION: 98 % | BODY MASS INDEX: 26.61 KG/M2 | WEIGHT: 175.6 LBS | RESPIRATION RATE: 16 BRPM | SYSTOLIC BLOOD PRESSURE: 109 MMHG | DIASTOLIC BLOOD PRESSURE: 78 MMHG

## 2021-06-03 DIAGNOSIS — R91.8 ABNORMAL FINDING ON LUNG IMAGING: ICD-10-CM

## 2021-06-03 DIAGNOSIS — U07.1 COVID-19: ICD-10-CM

## 2021-06-03 DIAGNOSIS — G43.009 NONINTRACTABLE MIGRAINE, UNSPECIFIED MIGRAINE TYPE: ICD-10-CM

## 2021-06-03 DIAGNOSIS — R10.31 RIGHT LOWER QUADRANT ABDOMINAL PAIN: Primary | ICD-10-CM

## 2021-06-03 PROCEDURE — 99214 OFFICE O/P EST MOD 30 MIN: CPT | Performed by: INTERNAL MEDICINE

## 2021-06-03 RX ORDER — RIZATRIPTAN BENZOATE 10 MG/1
TABLET, ORALLY DISINTEGRATING ORAL
Qty: 9 TABLET | Refills: 1 | Status: SHIPPED | OUTPATIENT
Start: 2021-06-03 | End: 2022-01-21

## 2021-06-03 NOTE — PROGRESS NOTES
Milla Lenz is a 53 y.o. female who presents today for Follow-up (pt is presenting today to talk about the covid vaccine, she did have covid-03/14/2021, hx of migraines )  . She has a history of   Patient Active Problem List   Diagnosis Code    Allergic rhinitis, cause unspecified J30.9    Calculus of kidney N20.0    Hepatic cyst K76.89    Splenic cyst D73.4    Migraine without aura and without status migrainosus, not intractable G43.009    Colon polyp K63.5    Right upper quadrant abdominal pain R10.11   . Today patient is here for f/u.     R sided abd pain: still having recurrent R abd pain. Normal C-scope. Normal colonoscopy. These episodes do not happen very often. She reports that she can likely live with it as we know that there is no structural abnormalities. She will monitor the symptoms. Abnormal finding on CT to left lower lobe. CT read reports 7 x 7 mm nodular density to left lung base. Patient does not have many exposure history or smoking history. We discussed doing a dedicated lung CT to follow-up on this. Migraines: Patient currently working with neurology. She is on Ajovy as well as Nurtec. They have ordered an MRI and EEG to further evaluate her ongoing headaches. Does have some abnormality of smell. Has a lot of questions regarding the Covid vaccine. She did have Covid earlier this year. She had a very mild case. She is concerned about the long-term side effects. We a long discussion regarding the safety efficacy of vaccine as far as we know. We also discussed that she likely has pretty good protection since she did get infected. ROS  Review of Systems   Constitutional: Negative for chills, fever and weight loss. HENT: Negative for congestion, ear discharge, ear pain, hearing loss, nosebleeds, sinus pain, sore throat and tinnitus. Smell changes. Eyes: Negative for blurred vision, double vision, photophobia and pain.    Respiratory: Negative for cough, hemoptysis, sputum production and shortness of breath. Cardiovascular: Negative for chest pain, palpitations, orthopnea and leg swelling. Gastrointestinal: Positive for abdominal pain. Negative for constipation, diarrhea, heartburn, nausea and vomiting. Genitourinary: Negative for dysuria, frequency and urgency. Musculoskeletal: Negative for back pain, joint pain, myalgias and neck pain. Skin: Negative for itching and rash. Neurological: Positive for headaches. Negative for dizziness, tingling, tremors, sensory change, speech change, focal weakness and weakness. Endo/Heme/Allergies: Does not bruise/bleed easily. Psychiatric/Behavioral: Negative for depression, memory loss and suicidal ideas. Visit Vitals  /78 (BP 1 Location: Left upper arm, BP Patient Position: Sitting, BP Cuff Size: Adult)   Pulse 66   Temp 98.2 °F (36.8 °C) (Oral)   Resp 16   Ht 5' 8\" (1.727 m)   Wt 175 lb 9.6 oz (79.7 kg)   SpO2 98%   BMI 26.70 kg/m²       Physical Exam  Constitutional:       Appearance: She is well-developed. HENT:      Head: Normocephalic and atraumatic. Cardiovascular:      Rate and Rhythm: Normal rate and regular rhythm. Heart sounds: No murmur heard. Pulmonary:      Effort: Pulmonary effort is normal. No respiratory distress. Skin:     General: Skin is warm and dry. Neurological:      Mental Status: She is alert and oriented to person, place, and time. Psychiatric:         Behavior: Behavior normal.           Current Outpatient Medications   Medication Sig    rizatriptan (MAXALT-MLT) 10 mg disintegrating tablet TAKE ONE TABLET BY MOUTH AT ONSET OF MIGRAINE AS NEEDED **DO NOT TAKE MORE THAN 1 TABLET PER DAY**    fremanezumab-vfrm (Ajovy Autoinjector) 225 mg/1.5 mL auto-injector 1.5 mL by SubCUTAneous route every month.  aspirin/salicylamide/caffeine (BC HEADACHE POWDER PO) Take  by mouth.  QUERCETIN DIHYDRATE, BULK, by Does Not Apply route.     cholecalciferol (VITAMIN D3) 1,000 unit cap Take  by mouth daily.  multivitamin (ONE A DAY) tablet Take 1 Tab by mouth daily.  cetirizine (ZYRTEC) 10 mg tablet Take 10 mg by mouth daily.  albuterol (PROVENTIL HFA, VENTOLIN HFA, PROAIR HFA) 90 mcg/actuation inhaler Take 1 Puff by inhalation every four (4) hours as needed for Wheezing. (Patient not taking: Reported on 6/3/2021)     No current facility-administered medications for this visit.         Past Medical History:   Diagnosis Date    Allergic rhinitis, cause unspecified     Calculus of kidney     Calculus of kidney     Colon polyp 11/14/2016    Depression     Headache(784.0)     Hepatic cyst 9/7/2016    HX OTHER MEDICAL     migraines and kidney stones    Migraine       Past Surgical History:   Procedure Laterality Date    COLONOSCOPY N/A 11/14/2016    COLONOSCOPY WITH ENDOSCOPIC SUBMUCOSAL DISSECTION (ESD) performed by Maddison Kelley [facility] at [facility] Right 2016    neg;ductogram    HX BREAST BIOPSY Right 2016    Neg; 2 MRI bx after ductogram    HX BREAST BIOPSY Left 2016    neg; mri bx    HX COLONOSCOPY  2016    HX GYN      novasure procedure    HX HEENT      tonsils and adenoids - once as of 11/11/2016    HX HYSTERECTOMY  2016    HX ORTHOPAEDIC      frozen (R) shoulder    HX OTHER SURGICAL      broken right arm x2 - no surgery    HX SKIN BIOPSY      HX TONSIL AND ADENOIDECTOMY  1978      Social History     Tobacco Use    Smoking status: Never Smoker    Smokeless tobacco: Never Used   Substance Use Topics    Alcohol use: Yes     Comment: 1-2 drinks per week      Family History   Problem Relation Age of Onset    Thyroid Disease Mother     Cancer Mother         skin - BCCA/squamous    Hypertension Father     Heart Disease Father     Other Father         ankylosing spondylitis    Elevated Lipids Father     Hypertension Paternal Grandmother     Elevated Lipids Paternal Grandmother     Breast Cancer Maternal Aunt 48    Cancer Maternal Aunt         breast    Heart Disease Paternal Grandfather         Allergies   Allergen Reactions    Latex Unknown (comments)    Lortab [Hydrocodone-Acetaminophen] Nausea and Vomiting     Nausea and vomiting    Adhesive Tape-Silicones Rash     With extended use of the tape.  Fioricet [Butalbital-Acetaminophen-Caff] Other (comments)     Insomnia. Pt does not recall being allergic to this.  Topamax [Topiramate] Other (comments)     Memory loss - short term memory. Assessment/Plan  Diagnoses and all orders for this visit:    1. Right lower quadrant abdominal pain-work-up thus far negative. I agree that this is more than likely scar tissue or musculoskeletal in nature. Continue to monitor    2. Abnormal finding on lung imaging-incidentally found on abdominal CT. We will get a dedicated lung CT. Does not have any high risk factors  -     CT CHEST WO CONT; Future    3. Nonintractable migraine, unspecified migraine type-seeing neurologist.  Has just started injection. We will refill Maxalt  -     rizatriptan (MAXALT-MLT) 10 mg disintegrating tablet; TAKE ONE TABLET BY MOUTH AT ONSET OF MIGRAINE AS NEEDED **DO NOT TAKE MORE THAN 1 TABLET PER DAY**    4. COVID-19-earlier this year. She does have some reservations regarding Covid vaccine. We a long discussion about safety and efficacy of vaccine. We also did discuss how she is likely well protected due to the fact that she had gotten infected. Maris Snow MD  6/3/2021    This note was created with the help of speech recognition software [employer]) and may contain some 'sound alike' errors.

## 2021-06-03 NOTE — PROGRESS NOTES
Dee MOSES Potts  Identified pt with two pt identifiers(name and ). Chief Complaint   Patient presents with    Follow-up     pt is presenting today to talk about the covid vaccine, she did have covid-2021, hx of migraines        1. Have you been to the ER, urgent care clinic since your last visit? Hospitalized since your last visit? NO    2. Have you seen or consulted any other health care providers outside of the [address] [place] [address] since your last visit? Include any pap smears or colon screening. NO      Provider notified of reason for visit, vitals and flowsheets obtained on patients.      Patient received paperwork for advance directive during previous visit but has not completed at this time     Reviewed record In preparation for visit, huddled with provider and have obtained necessary documentation      Health Maintenance Due   Topic    Hepatitis C Screening     COVID-19 Vaccine (1)    DTaP/Tdap/Td series (2 - Td or Tdap)       Wt Readings from Last 3 Encounters:   21 175 lb 9.6 oz (79.7 kg)   21 173 lb (78.5 kg)   19 172 lb (78 kg)     Temp Readings from Last 3 Encounters:   21 98.2 °F (36.8 °C) (Oral)   21 98.9 °F (37.2 °C) (Temporal)   19 98.1 °F (36.7 °C) (Oral)     BP Readings from Last 3 Encounters:   21 109/78   21 102/66   21 101/70     Pulse Readings from Last 3 Encounters:   21 66   21 73   21 83     Vitals:    21 1108   BP: 109/78   Pulse: 66   Resp: 16   Temp: 98.2 °F (36.8 °C)   TempSrc: Oral   SpO2: 98%   Weight: 175 lb 9.6 oz (79.7 kg)   Height: 5' 8\" (1.727 m)   PainSc:   0 - No pain   LMP: 2016         Learning Assessment:  :     Learning Assessment 3/16/2017   PRIMARY LEARNER Patient   PRIMARY LANGUAGE ENGLISH   LEARNER PREFERENCE PRIMARY DEMONSTRATION   ANSWERED BY PATIENT   RELATIONSHIP SELF       Depression Screening:  :     3 most recent PHQ Screens 6/3/2021   Little interest or pleasure in doing things Not at all   Feeling down, depressed, irritable, or hopeless Not at all   Total Score PHQ 2 0       Fall Risk Assessment:  :     Fall Risk Assessment, last 12 mths 5/8/2019   Able to walk? Yes   Fall in past 12 months? No       Abuse Screening:  :     Abuse Screening Questionnaire 5/8/2019 12/14/2018   Do you ever feel afraid of your partner? N N   Are you in a relationship with someone who physically or mentally threatens you? N N   Is it safe for you to go home? Y Y       ADL Screening:  :     No flowsheet data found. Medication reconciliation up to date and corrected with patient at this time.

## 2021-06-08 ENCOUNTER — HOSPITAL ENCOUNTER (OUTPATIENT)
Dept: NEUROLOGY | Age: 51
Discharge: HOME | End: 2021-06-08
Attending: PSYCHIATRY & NEUROLOGY
Payer: COMMERCIAL

## 2021-06-08 DIAGNOSIS — R43.1 OLFACTORY AURA: ICD-10-CM

## 2021-06-08 DIAGNOSIS — R56.9 SEIZURE-LIKE ACTIVITY (HCC): ICD-10-CM

## 2021-06-08 PROCEDURE — 95819 EEG AWAKE AND ASLEEP: CPT | Performed by: PSYCHIATRY & NEUROLOGY

## 2021-06-08 PROCEDURE — 95816 EEG AWAKE AND DROWSY: CPT

## 2021-06-09 NOTE — PROCEDURES
[facility]  EEG    Name:  Gerardine Goldberg  MR#:  [id number]  :  1970  ACCOUNT #:  [de-identified]  DATE OF SERVICE:  2021    REQUESTING PHYSICIAN:  Beba Reynaga DO    HISTORY:  The patient is a 28-year-old female who is being evaluated for intermittent olfactory auras and to rule out seizures. DESCRIPTION:  This is an 18-channel EEG performed on an awake, drowsy and asleep patient. During wakefulness, the dominant posterior background rhythm consists of symmetric, very well-modulated medium voltage rhythms in the 9-10 Hz frequency range out of the posterior head region, reactive to eye opening and closure. Drowsiness is characterized by slowing and vertex waves. Stage II non-REM sleep reveals symmetric sleep spindles. Photic stimulation elicits a symmetric driving response. Hyperventilation was not performed. EEG SUMMARY:  Normal study. CLINICAL INTERPRETATION:  This was a normal EEG during awake, drowsy and sleep states of the patient. No lateralizing or epileptiform features were noted.       Adrien Poe MD      [id number]  D:  2021 12:49  T:  2021 21:11  JOB #:  [id number]

## 2021-06-14 ENCOUNTER — HOSPITAL ENCOUNTER (OUTPATIENT)
Dept: CT IMAGING | Age: 51
Discharge: HOME | End: 2021-06-14
Attending: INTERNAL MEDICINE
Payer: COMMERCIAL

## 2021-06-14 ENCOUNTER — HOSPITAL ENCOUNTER (OUTPATIENT)
Dept: MRI IMAGING | Age: 51
Discharge: HOME | End: 2021-06-14
Attending: PSYCHIATRY & NEUROLOGY

## 2021-06-14 DIAGNOSIS — R51.9 CHRONIC DAILY HEADACHE: ICD-10-CM

## 2021-06-14 DIAGNOSIS — G43.109 MIGRAINE WITH AURA AND WITHOUT STATUS MIGRAINOSUS, NOT INTRACTABLE: ICD-10-CM

## 2021-06-14 DIAGNOSIS — R43.1 OLFACTORY AURA: ICD-10-CM

## 2021-06-14 DIAGNOSIS — R91.8 ABNORMAL FINDING ON LUNG IMAGING: ICD-10-CM

## 2021-06-14 PROCEDURE — 70553 MRI BRAIN STEM W/O & W/DYE: CPT | Performed by: PSYCHIATRY & NEUROLOGY

## 2021-06-14 PROCEDURE — 71250 CT THORAX DX C-: CPT | Performed by: INTERNAL MEDICINE

## 2021-06-14 RX ORDER — GADOTERATE MEGLUMINE 376.9 MG/ML
15 INJECTION INTRAVENOUS
Status: COMPLETED | OUTPATIENT
Start: 2021-06-14 | End: 2021-06-14

## 2021-06-14 RX ADMIN — GADOTERATE MEGLUMINE 15 ML: 376.9 INJECTION INTRAVENOUS at 10:25

## 2021-07-15 ENCOUNTER — HOSPITAL ENCOUNTER (OUTPATIENT)
Dept: MAMMOGRAPHY | Age: 51
Discharge: HOME | End: 2021-07-15
Attending: OBSTETRICS & GYNECOLOGY

## 2021-07-15 DIAGNOSIS — Z12.31 VISIT FOR SCREENING MAMMOGRAM: ICD-10-CM

## 2021-07-22 ENCOUNTER — OFFICE VISIT (OUTPATIENT)
Dept: NEUROLOGY | Age: 51
End: 2021-07-22
Payer: COMMERCIAL

## 2021-07-22 VITALS
OXYGEN SATURATION: 98 % | SYSTOLIC BLOOD PRESSURE: 106 MMHG | RESPIRATION RATE: 18 BRPM | HEART RATE: 78 BPM | DIASTOLIC BLOOD PRESSURE: 68 MMHG

## 2021-07-22 DIAGNOSIS — G43.109 MIGRAINE WITH AURA AND WITHOUT STATUS MIGRAINOSUS, NOT INTRACTABLE: Primary | ICD-10-CM

## 2021-07-22 DIAGNOSIS — R43.1 OLFACTORY AURA: ICD-10-CM

## 2021-07-22 DIAGNOSIS — G47.33 OSA (OBSTRUCTIVE SLEEP APNEA): ICD-10-CM

## 2021-07-22 PROCEDURE — 99213 OFFICE O/P EST LOW 20 MIN: CPT | Performed by: PSYCHIATRY & NEUROLOGY

## 2021-07-22 RX ORDER — FREMANEZUMAB-VFRM 225 MG/1.5ML
225 INJECTION SUBCUTANEOUS
Qty: 1 SYRINGE | Refills: 5 | Status: SHIPPED | OUTPATIENT
Start: 2021-07-22 | End: 2022-01-21 | Stop reason: SDUPTHER

## 2021-07-22 NOTE — PROGRESS NOTES
Neurology Clinic Follow up Note    Patient ID:  Saad Mohamud  [id number]  58 y.o.  1970      Ms. Marcos Barton is here for follow up today of migraines. Last Appointment With Me:  5/25/2021      Interval History:   She has noted a reduction in headache frequency on Ajovy injections. She appears to be tolerating injections without noted side effects. She reports no headaches at all over the past few months. She is still experiencing occasional olfactory aura occurring 3-4x weekly lasting several minutes at a time. Using Nurtec PRN for migraine rescue therapy which works well when needed. PMHx/ PSHx/ FHx/ SHx:  Reviewed and unchanged previous visit. Past Medical History:   Diagnosis Date    Allergic rhinitis, cause unspecified     Calculus of kidney     Calculus of kidney     Colon polyp 11/14/2016    Depression     Headache(784.0)     Hepatic cyst 9/7/2016    HX OTHER MEDICAL     migraines and kidney stones    Migraine          ROS:  Comprehensive review of systems negative except for as noted above. Objective:       Meds:  Current Outpatient Medications   Medication Sig Dispense Refill    rizatriptan (MAXALT-MLT) 10 mg disintegrating tablet TAKE ONE TABLET BY MOUTH AT ONSET OF MIGRAINE AS NEEDED **DO NOT TAKE MORE THAN 1 TABLET PER DAY** 9 Tablet 1    fremanezumab-vfrm (Ajovy Autoinjector) 225 mg/1.5 mL auto-injector 1.5 mL by SubCUTAneous route every month. 1 Syringe 2    aspirin/salicylamide/caffeine (BC HEADACHE POWDER PO) Take  by mouth.  QUERCETIN DIHYDRATE, BULK, by Does Not Apply route.  cholecalciferol (VITAMIN D3) 1,000 unit cap Take  by mouth daily.  multivitamin (ONE A DAY) tablet Take 1 Tab by mouth daily.  cetirizine (ZYRTEC) 10 mg tablet Take 10 mg by mouth daily.          Exam:  Visit Vitals  /68   Pulse 78   Resp 18   LMP 11/02/2016   SpO2 98%     NEUROLOGICAL EXAM:  General: Awake, alert, speech fluent  CN: PERRL, EOMI without nystagmus, VFF to confrontation, facial sensation and strength are normal and symmetric, hearing is intact to finger rub bilaterally, palate and tongue movements are intact and symmetric. Motor: Normal tone, bulk and strength bilaterally. Reflexes: Deferred  Coordination: FNF, ALEXIA, HTS intact. Sensation: LT intact throughout. Gait: Normal-based and steady. LABS  Results for orders placed or performed in visit on 05/30/18   LYME AB, IGG & IGM BY WB   Result Value Ref Range    IgG P93 Ab Present (A)     IgG P66 Ab Absent     IgG P58 Ab Absent     IgG P45 Ab Absent     IgG P41 Ab Present (A)     IgG P39 Ab Absent     IgG P30 Ab Absent     IgG P28 Ab Absent     IgG P23 Ab Absent     IgG P18 Ab Absent     Lyme Ab, IgG WB Interp. Negative     IgM P41 Ab Absent     IgM P39 Ab Absent     IgM P23 Ab Absent     Lyme Ab, IgM WB Interp. Negative    VITAMIN B12 & FOLATE   Result Value Ref Range    Vitamin B12 773 232 - 1,245 pg/mL    Folate 18.5 >3.0 ng/mL   TSH 3RD GENERATION   Result Value Ref Range    TSH 0.944 0.450 - 4.500 uIU/mL       IMAGING:  MRI Results (most recent):  Results from Hospital Encounter encounter on 06/14/21    MRI BRAIN W WO CONT    Narrative  Clinical history: olfactory aura, chronic headaches  INDICATION:   olfactory aura, chronic headaches    COMPARISON: None  TECHNIQUE: MR examination of the brain includes axial and sagittal T1 , axial  T2, axial FLAIR, axial gradient echo, axial DWI, coronal T1 . Pre and post  contrast axial T1-weighted imaging. Postcontrast T1-weighted imaging coronal  plane. CONTRAST: 15 ml ProHance  FINDINGS:  There is no intracranial mass, hemorrhage or acute infarction. IACs are symmetric in size. . There is no abnormal parenchymal enhancement. There  is no abnormal meningeal enhancement demonstrated. The brain architecture is  normal. There is no evidence of midline shift or mass-effect. The ventricles are  normal in size, position and configuration.   There are no extra-axial fluid  collections. Major intracranial vascular flow-voids are unremarkable. The orbits  are grossly symmetric. Dural venous sinuses are grossly unremarkable. There is  no Chiari or syrinx. Pituitary and infundibulum grossly unremarkable. Cerebellopontine angles are unremarkable. No skull base mass is identified. Cavernous sinuses are symmetric. The mastoid air cells and are well pneumatized  and clear. Impression  Normal MRI of the brain. No intracranial mass, hemorrhage or evidence of acute infarction. Assessment:     Encounter Diagnoses     ICD-10-CM ICD-9-CM   1. Migraine with aura and without status migrainosus, not intractable  G43.109 346.00   2. Olfactory aura  R43.1 781.1   3. AMARI (obstructive sleep apnea)  G47.33 327.23      53year old female followed for chronic daily headaches and migraines w/aura since childhood. Headaches have been refractory to betablockers, TPM IR/ER, SSRIs previously. PSG completed with recommendations for CPAP. She is presently using a mouth guard. She has noted a significant reduction in headache frequency on Ajovy injections. Will continue Nurtec PRN for migraine rescue therapy due to ineffectiveness with previous triptan therapy. Since last visit, she did complete work up for her olfactory auras including MRI Brain which did not reveal any acute structural pathology as well as routine EEG without noted epileptiform activity. We discussed the possibility of aura associated with migraines. Will continue to monitor. Headache education  Discussed pathophysiology of headache. Discussed treatment options, both abortive and preventive medications. Instructed patient about medications and potential side effects. Plan:   Continue Ajovy injections for migraine prophylaxis   Continue Nurtec PRN for migraine rescue therapy   Continue mouth guard for AMARI-f/u with sleep medicine    Follow-up and Dispositions    · Return in about 6 months (around 1/22/2022). I have discussed the diagnosis with the patient today and the intended plan as seen in the above orders with both the patient as well as referring provider and/or PCP via electronic correspondence. The patient has received an after-visit summary and questions were answered concerning future plans. I have discussed medication side effects and warnings with the patient as well.       Signed:  Don Spring DO  7/22/2021

## 2021-07-23 ENCOUNTER — HOSPITAL ENCOUNTER (OUTPATIENT)
Dept: MAMMOGRAPHY | Age: 51
Discharge: HOME | End: 2021-07-23
Attending: OBSTETRICS & GYNECOLOGY
Payer: COMMERCIAL

## 2021-07-23 ENCOUNTER — TRANSCRIBE ORDER (OUTPATIENT)
Dept: MAMMOGRAPHY | Age: 51
End: 2021-07-23

## 2021-07-23 DIAGNOSIS — N60.19 FIBROCYSTIC BREAST DISEASE: ICD-10-CM

## 2021-07-23 DIAGNOSIS — N60.19 FIBROCYSTIC BREAST DISEASE: Primary | ICD-10-CM

## 2021-07-23 PROCEDURE — 77066 DX MAMMO INCL CAD BI: CPT

## 2021-07-23 PROCEDURE — 77063 BREAST TOMOSYNTHESIS BI: CPT

## 2021-07-23 PROCEDURE — 77062 BREAST TOMOSYNTHESIS BI: CPT

## 2022-01-21 ENCOUNTER — OFFICE VISIT (OUTPATIENT)
Dept: NEUROLOGY | Age: 52
End: 2022-01-21
Payer: COMMERCIAL

## 2022-01-21 VITALS
RESPIRATION RATE: 16 BRPM | SYSTOLIC BLOOD PRESSURE: 110 MMHG | WEIGHT: 157 LBS | DIASTOLIC BLOOD PRESSURE: 74 MMHG | HEIGHT: 68 IN | BODY MASS INDEX: 23.79 KG/M2 | OXYGEN SATURATION: 100 % | HEART RATE: 74 BPM

## 2022-01-21 DIAGNOSIS — G43.109 MIGRAINE WITH AURA AND WITHOUT STATUS MIGRAINOSUS, NOT INTRACTABLE: Primary | ICD-10-CM

## 2022-01-21 DIAGNOSIS — G47.33 OSA (OBSTRUCTIVE SLEEP APNEA): ICD-10-CM

## 2022-01-21 DIAGNOSIS — R43.1 OLFACTORY AURA: ICD-10-CM

## 2022-01-21 PROCEDURE — 99214 OFFICE O/P EST MOD 30 MIN: CPT | Performed by: PSYCHIATRY & NEUROLOGY

## 2022-01-21 RX ORDER — RIMEGEPANT SULFATE 75 MG/75MG
75 TABLET, ORALLY DISINTEGRATING ORAL
Qty: 8 TABLET | Refills: 2 | Status: SHIPPED | OUTPATIENT
Start: 2022-01-21 | End: 2022-01-21

## 2022-01-21 RX ORDER — RIMEGEPANT SULFATE 75 MG/75MG
75 TABLET, ORALLY DISINTEGRATING ORAL
COMMUNITY
End: 2022-01-21 | Stop reason: SDUPTHER

## 2022-01-21 RX ORDER — FREMANEZUMAB-VFRM 225 MG/1.5ML
225 INJECTION SUBCUTANEOUS
Qty: 1.5 ML | Refills: 5 | Status: SHIPPED | OUTPATIENT
Start: 2022-01-21

## 2022-01-21 RX ORDER — SEMAGLUTIDE 1.34 MG/ML
INJECTION, SOLUTION SUBCUTANEOUS
COMMUNITY
Start: 2021-12-22

## 2022-01-21 NOTE — PROGRESS NOTES
Neurology Clinic Follow up Note    Patient ID:  Valeriy Gonzalez  [id number]  49 y.o.  1970      Ms. Brisa Pritchard is here for follow up today of migraines. Last Appointment With Me:  7/22/21    Interval History:   She has noted a reduction in headache frequency on Ajovy injections. She appears to be tolerating injections without noted side effects. Olfactory aura has resolved. Using Nurtec PRN for migraine rescue therapy which works well when needed. She reports significant weight loss after starting Ozempic. PMHx/ PSHx/ FHx/ SHx:  Reviewed and unchanged previous visit. Past Medical History:   Diagnosis Date    Allergic rhinitis, cause unspecified     Calculus of kidney     Calculus of kidney     Colon polyp 11/14/2016    Depression     Headache(784.0)     Hepatic cyst 9/7/2016    HX OTHER MEDICAL     migraines and kidney stones    Migraine          ROS:  Comprehensive review of systems negative except for as noted above. Objective:       Meds:  Current Outpatient Medications   Medication Sig Dispense Refill    Ozempic 0.25 mg or 0.5 mg/dose (2 mg/1.5 ml) subq pen       rimegepant (Nurtec ODT) 75 mg disintegrating tablet Take 75 mg by mouth once as needed for Migraine.  fremanezumab-vfrm (Ajovy Autoinjector) 225 mg/1.5 mL auto-injector 1.5 mL by SubCUTAneous route every month. 1 Syringe 5    multivitamin (ONE A DAY) tablet Take 1 Tab by mouth daily.  cetirizine (ZYRTEC) 10 mg tablet Take 10 mg by mouth as needed.          Exam:  Visit Vitals  /74 (BP 1 Location: Right arm, BP Patient Position: Sitting, BP Cuff Size: Large adult)   Pulse 74   Resp 16   Ht 5' 8\" (1.727 m)   Wt 157 lb (71.2 kg)   LMP 11/02/2016   SpO2 100%   BMI 23.87 kg/m²     NEUROLOGICAL EXAM:  General: Awake, alert, speech fluent  CN: PERRL, EOMI without nystagmus, VFF to confrontation, facial sensation and strength are normal and symmetric, hearing is intact to finger rub bilaterally, palate and tongue movements are intact and symmetric. Motor: Normal tone, bulk and strength bilaterally. Reflexes: 1/4 throughout  Coordination: FNF, ALEXIA, HTS intact. Sensation: LT intact throughout. Gait: Normal-based and steady. LABS  Results for orders placed or performed in visit on 05/30/18   LYME AB, IGG & IGM BY WB   Result Value Ref Range    IgG P93 Ab Present (A)     IgG P66 Ab Absent     IgG P58 Ab Absent     IgG P45 Ab Absent     IgG P41 Ab Present (A)     IgG P39 Ab Absent     IgG P30 Ab Absent     IgG P28 Ab Absent     IgG P23 Ab Absent     IgG P18 Ab Absent     Lyme Ab, IgG WB Interp. Negative     IgM P41 Ab Absent     IgM P39 Ab Absent     IgM P23 Ab Absent     Lyme Ab, IgM WB Interp. Negative    VITAMIN B12 & FOLATE   Result Value Ref Range    Vitamin B12 773 232 - 1,245 pg/mL    Folate 18.5 >3.0 ng/mL   TSH 3RD GENERATION   Result Value Ref Range    TSH 0.944 0.450 - 4.500 uIU/mL       IMAGING:  MRI Results (most recent):  Results from Hospital Encounter encounter on 06/14/21    MRI BRAIN W WO CONT    Narrative  Clinical history: olfactory aura, chronic headaches  INDICATION:   olfactory aura, chronic headaches    COMPARISON: None  TECHNIQUE: MR examination of the brain includes axial and sagittal T1 , axial  T2, axial FLAIR, axial gradient echo, axial DWI, coronal T1 . Pre and post  contrast axial T1-weighted imaging. Postcontrast T1-weighted imaging coronal  plane. CONTRAST: 15 ml ProHance  FINDINGS:  There is no intracranial mass, hemorrhage or acute infarction. IACs are symmetric in size. . There is no abnormal parenchymal enhancement. There  is no abnormal meningeal enhancement demonstrated. The brain architecture is  normal. There is no evidence of midline shift or mass-effect. The ventricles are  normal in size, position and configuration. There are no extra-axial fluid  collections. Major intracranial vascular flow-voids are unremarkable. The orbits  are grossly symmetric.  Dural venous sinuses are grossly unremarkable. There is  no Chiari or syrinx. Pituitary and infundibulum grossly unremarkable. Cerebellopontine angles are unremarkable. No skull base mass is identified. Cavernous sinuses are symmetric. The mastoid air cells and are well pneumatized  and clear. Impression  Normal MRI of the brain. No intracranial mass, hemorrhage or evidence of acute infarction. Assessment:     Encounter Diagnoses     ICD-10-CM ICD-9-CM   1. Migraine with aura and without status migrainosus, not intractable  G43.109 346.00   2. Olfactory aura  R43.1 781.1   3. AMARI (obstructive sleep apnea)  G47.33 327.23      49 y.o. female followed for chronic daily headaches and migraines w/aura since childhood. Headaches have been refractory to betablockers, TPM IR/ER, SSRIs previously. MRI Brain previously reviewed without acute pathology. PSG completed with recommendations for CPAP. She has enjoyed some weight loss since our last visit which may assist with her sleep apnea. She reports a significant reduction in headache frequency on Ajovy injections. Denies recurrent olfactory aura. Headache education  Discussed pathophysiology of headache. Discussed treatment options, both abortive and preventive medications. Instructed patient about medications and potential side effects. Plan:   Continue Ajovy injections for migraine prophylaxis   Continue Nurtec PRN for migraine rescue therapy        Follow-up and Dispositions    · Return in about 6 months (around 7/21/2022). I have discussed the diagnosis with the patient today and the intended plan as seen in the above orders with both the patient as well as referring provider and/or PCP via electronic correspondence. The patient has received an after-visit summary and questions were answered concerning future plans. I have discussed medication side effects and warnings with the patient as well.       Signed:  Santiago Hill DO  1/21/2022

## 2022-08-02 ENCOUNTER — PATIENT MESSAGE (OUTPATIENT)
Dept: INTERNAL MEDICINE CLINIC | Age: 52
End: 2022-08-02

## 2022-08-02 NOTE — TELEPHONE ENCOUNTER
----- Message from Sumanth Alex sent at 8/2/2022  9:35 AM EDT -----  Subject: Referral Request    Reason for referral request? Pt states that she has vericose veins and   they are becomming painful and throbbing and burning feeling. wanted to   see who she can be referred to. please call patient and advise her  Provider patient wants to be referred to(if known):     Provider Phone Number(if known):     Additional Information for Provider?   ---------------------------------------------------------------------------  --------------  [id number]    [phone]; OK to leave message on voicemail  ---------------------------------------------------------------------------  --------------

## 2022-08-03 ENCOUNTER — TRANSCRIBE ORDER (OUTPATIENT)
Dept: SCHEDULING | Age: 52
End: 2022-08-03

## 2022-08-03 DIAGNOSIS — Z80.3 FAMILY HISTORY OF BREAST CANCER: ICD-10-CM

## 2022-08-03 DIAGNOSIS — Z12.31 VISIT FOR SCREENING MAMMOGRAM: Primary | ICD-10-CM

## 2022-11-10 RX ORDER — FREMANEZUMAB-VFRM 225 MG/1.5ML
INJECTION SUBCUTANEOUS
Qty: 1.5 ML | Refills: 5 | Status: SHIPPED | OUTPATIENT
Start: 2022-11-10

## 2022-11-30 NOTE — PROGRESS NOTES
Naz Wolfe is a 47 y.o. female who presents today for Physical (Pt presenting today for annual CPE; concerned with a nodule on the back of throat hurts to swallow, has scheduled an appointment for ENT; mother just recently dx with thyroid ca)  . She has a history of   Patient Active Problem List   Diagnosis Code    Allergic rhinitis, cause unspecified J30.9    Calculus of kidney N20.0    Hepatic cyst K76.89    Splenic cyst D73.4    Migraine without aura and without status migrainosus, not intractable G43.009    Colon polyp K63.5    Right upper quadrant abdominal pain R10.11   . Today patient is here for CPE. she does not have other concerns. Did have a bit of an upper respiratory infection in October. Since has had continued right side ear fullness and a small lesion to the posterior right oropharynx. Throat is slightly tender especially with pressure. Has been able to maintain an almost 25 pound weight loss over the last 2 years. Was on Ozempic for short period of time. Has also been working with a . Patient congratulated on weight loss and maintenance of weight loss. Migraines: managed with Neurology. Doing very well. Seeing Dermatology regularly. On trulance for chronic constipation. Has helped with vaginal prolapse. Mild AMARI: managed by weight loss and oral device. .      L lower lobe lesion, last done in 2021, repeat CT chest.     Anxiety overall is still present, but off SSRI. Not interested in pharmaco therapy. Health maintenance hx includes:  Exercise: moderately active. Form of exercise: . Diet: generally follows a low fat low cholesterol diet  Social: at home with  and two sons children. Screening:    Colon cancer screening:  Last Colonoscopy:  2021  and   was abnormal: 3 yr follow up   Breast cancer screening: last mammogram  Scheduled for this month.       Cervical cancer screening:Hysterectomy: done at [address] [place] [address] on 2/3/17. Had hysterectomy with bilateral salpingectomy due to cyst.   Osteoporosis screening: N/A      Immunizations:     Immunization History   Administered Date(s) Administered    Influenza Vaccine PF 09/25/2013    Influenza Vaccine Split 10/10/2011    Influenza, FLUARIX, FLULAVAL, FLUZONE (age 10 mo+) AND AFLURIA, (age 1 y+), PF, 0.5mL 09/28/2016    TDAP Vaccine 01/01/2010      Immunization status: up to date and documented. ROS  Review of Systems   Constitutional:  Negative for chills, fever and weight loss. HENT:  Positive for sore throat. Negative for congestion. R ear fullness   Eyes:  Negative for blurred vision, double vision and photophobia. Respiratory:  Negative for cough and shortness of breath. Cardiovascular:  Negative for chest pain, palpitations and leg swelling. Gastrointestinal:  Negative for abdominal pain, constipation, diarrhea, heartburn, nausea and vomiting. Genitourinary:  Negative for dysuria, frequency and urgency. Musculoskeletal:  Negative for joint pain and myalgias. Skin:  Negative for rash. Neurological: Negative. Negative for headaches. Endo/Heme/Allergies:  Does not bruise/bleed easily. Psychiatric/Behavioral:  Negative for memory loss and suicidal ideas. The patient is nervous/anxious. Visit Vitals  /75 (BP 1 Location: Left upper arm, BP Patient Position: Sitting, BP Cuff Size: Adult)   Pulse 87   Temp 98.2 °F (36.8 °C) (Oral)   Resp 16   Ht 5' 8\" (1.727 m)   Wt 158 lb 9.6 oz (71.9 kg)   SpO2 96%   BMI 24.12 kg/m²       Physical Exam  Constitutional:       General: She is not in acute distress. Appearance: She is well-developed. HENT:      Head: Normocephalic and atraumatic. Mouth/Throat:        Comments: Small white aphthous ulcer-like area were noted. Slightly firm to the touch. No surrounding erythema. Neck:      Thyroid: No thyromegaly. Cardiovascular:      Rate and Rhythm: Normal rate and regular rhythm.       Heart sounds: No murmur heard. Pulmonary:      Effort: Pulmonary effort is normal.      Breath sounds: Normal breath sounds. No wheezing. Abdominal:      General: Bowel sounds are normal. There is no distension. Palpations: Abdomen is soft. Musculoskeletal:      Cervical back: Normal range of motion and neck supple. Skin:     General: Skin is warm and dry. Neurological:      Mental Status: She is alert and oriented to person, place, and time. Cranial Nerves: No cranial nerve deficit. Psychiatric:         Behavior: Behavior normal.         Current Outpatient Medications   Medication Sig    Trulance 3 mg tab     Jublia adan topical solution     Ajovy Autoinjector 225 mg/1.5 mL auto-injector INJECT THE CONTENTS OF 1 PEN UNDER THE SKIN ONCE MONTHLY    Ozempic 0.25 mg or 0.5 mg/dose (2 mg/1.5 ml) subq pen     multivitamin (ONE A DAY) tablet Take 1 Tab by mouth daily. cetirizine (ZYRTEC) 10 mg tablet Take 10 mg by mouth as needed. No current facility-administered medications for this visit.         Past Medical History:   Diagnosis Date    Allergic rhinitis, cause unspecified     Calculus of kidney     Calculus of kidney     Colon polyp 11/14/2016    Depression     Headache(784.0)     Hepatic cyst 9/7/2016    HX OTHER MEDICAL     migraines and kidney stones    Migraine       Past Surgical History:   Procedure Laterality Date    COLONOSCOPY N/A 11/14/2016    COLONOSCOPY WITH ENDOSCOPIC SUBMUCOSAL DISSECTION (ESD) performed by Jaems Bruce MD at [facility] ENDOSCOPY    [address] [facility] Right 2016    neg;ductogram    HX BREAST BIOPSY Right 2016    Neg; 2 MRI bx after ductogram    HX BREAST BIOPSY Left 2016    neg; mri bx    HX COLONOSCOPY  2016    HX GYN      novasure procedure    HX HEENT      tonsils and adenoids - once as of 11/11/2016    HX HYSTERECTOMY  2016    HX ORTHOPAEDIC      frozen (R) shoulder    HX OTHER SURGICAL      broken right arm x2 - no surgery    HX SKIN BIOPSY      HX TONSIL AND ADENOIDECTOMY  1978      Social History     Tobacco Use    Smoking status: Never    Smokeless tobacco: Never   Substance Use Topics    Alcohol use: Yes     Comment: 1-2 drinks per week      Family History   Problem Relation Age of Onset    Thyroid Disease Mother     Cancer Mother         skin - BCCA/squamous    Hypertension Father     Heart Disease Father     Other Father         ankylosing spondylitis    Elevated Lipids Father     Hypertension Paternal Grandmother     Elevated Lipids Paternal Grandmother     Breast Cancer Maternal Aunt 48    Cancer Maternal Aunt         breast    Heart Disease Paternal Grandfather         Allergies   Allergen Reactions    Latex Unknown (comments)    Lortab [Hydrocodone-Acetaminophen] Nausea and Vomiting     Nausea and vomiting    Adhesive Tape-Silicones Rash     With extended use of the tape. Fioricet [Butalbital-Acetaminophen-Caff] Other (comments)     Insomnia. Pt does not recall being allergic to this. Topamax [Topiramate] Other (comments)     Memory loss - short term memory. Assessment/Plan  Diagnoses and all orders for this visit:    1. Wellness examination- Sharmaine Neil was counseled on age-appropriate/ guideline-based risk prevention behaviors and screening for a 47y.o. year old   female . We also discussed adjustments in screening based on family history if necessary. Printed instructions for preventative screening guidelines and healthy behaviors given to patient with after visit summary. 2. Abnormal finding on lung [id number] lung nodule. If stable no further evaluation needed  -     CT CHEST WO CONT; Future    3. Hyperlipidemia, unspecified hyperlipidemia type-mild in the past.  She will upload most recent labs from [address] [place] [address]; Future  -     LIPID PANEL; Future  -     CBC WITH AUTOMATED DIFF; Future    4. Vitamin D deficiency  -     VITAMIN D, 25 HYDROXY; Future    5.  Migraine without aura and without status migrainosus, not intractable-overall stable    6. Throat discomfort-does have an aphthous-like ulcer to the back right tonsillar area. Slightly tender to the touch. Does have some right-sided ear fullness but no signs of victoriano sinusitis. Agree with ENT evaluation, but may be left over from recent upper respiratory infection. 7. Ear fullness, right          Omar Person MD  12/1/2022    This note was created with the help of speech recognition software Michael Fermin) and may contain some 'sound alike' errors.

## 2022-12-01 ENCOUNTER — OFFICE VISIT (OUTPATIENT)
Dept: INTERNAL MEDICINE CLINIC | Age: 52
End: 2022-12-01
Payer: COMMERCIAL

## 2022-12-01 VITALS
RESPIRATION RATE: 16 BRPM | SYSTOLIC BLOOD PRESSURE: 107 MMHG | OXYGEN SATURATION: 96 % | WEIGHT: 158.6 LBS | BODY MASS INDEX: 24.04 KG/M2 | DIASTOLIC BLOOD PRESSURE: 75 MMHG | HEIGHT: 68 IN | TEMPERATURE: 98.2 F | HEART RATE: 87 BPM

## 2022-12-01 DIAGNOSIS — Z00.00 WELLNESS EXAMINATION: Primary | ICD-10-CM

## 2022-12-01 DIAGNOSIS — R91.8 ABNORMAL FINDING ON LUNG IMAGING: ICD-10-CM

## 2022-12-01 DIAGNOSIS — E55.9 VITAMIN D DEFICIENCY: ICD-10-CM

## 2022-12-01 DIAGNOSIS — G43.009 MIGRAINE WITHOUT AURA AND WITHOUT STATUS MIGRAINOSUS, NOT INTRACTABLE: ICD-10-CM

## 2022-12-01 DIAGNOSIS — E78.5 HYPERLIPIDEMIA, UNSPECIFIED HYPERLIPIDEMIA TYPE: ICD-10-CM

## 2022-12-01 DIAGNOSIS — R07.0 THROAT DISCOMFORT: ICD-10-CM

## 2022-12-01 DIAGNOSIS — H93.8X1 EAR FULLNESS, RIGHT: ICD-10-CM

## 2022-12-01 RX ORDER — EFINACONAZOLE 100 MG/ML
SOLUTION TOPICAL
COMMUNITY
Start: 2022-09-19

## 2022-12-01 RX ORDER — PLECANATIDE 3 MG/1
TABLET ORAL
COMMUNITY
Start: 2022-11-02

## 2022-12-02 ENCOUNTER — HOSPITAL ENCOUNTER (OUTPATIENT)
Dept: MAMMOGRAPHY | Age: 52
Discharge: HOME | End: 2022-12-02
Attending: OBSTETRICS & GYNECOLOGY
Payer: COMMERCIAL

## 2022-12-02 DIAGNOSIS — Z12.31 VISIT FOR SCREENING MAMMOGRAM: ICD-10-CM

## 2022-12-02 DIAGNOSIS — Z80.3 FAMILY HISTORY OF BREAST CANCER: ICD-10-CM

## 2022-12-02 PROCEDURE — 77063 BREAST TOMOSYNTHESIS BI: CPT

## 2022-12-03 LAB
25(OH)D3 SERPL-MCNC: 29.1 NG/ML (ref 30–100)
ALBUMIN SERPL-MCNC: 3.9 G/DL (ref 3.5–5)
ALBUMIN/GLOB SERPL: 1.4 {RATIO} (ref 1.1–2.2)
ALP SERPL-CCNC: 54 U/L (ref 45–117)
ALT SERPL-CCNC: 28 U/L (ref 12–78)
ANION GAP SERPL CALC-SCNC: 5 MMOL/L (ref 5–15)
AST SERPL-CCNC: 18 U/L (ref 15–37)
BASOPHILS # BLD: 0 K/UL (ref 0–0.1)
BASOPHILS NFR BLD: 1 % (ref 0–1)
BILIRUB SERPL-MCNC: 0.7 MG/DL (ref 0.2–1)
BUN SERPL-MCNC: 16 MG/DL (ref 6–20)
BUN/CREAT SERPL: 20 (ref 12–20)
CALCIUM SERPL-MCNC: 8.9 MG/DL (ref 8.5–10.1)
CHLORIDE SERPL-SCNC: 105 MMOL/L (ref 97–108)
CHOLEST SERPL-MCNC: 194 MG/DL
CO2 SERPL-SCNC: 28 MMOL/L (ref 21–32)
CREAT SERPL-MCNC: 0.8 MG/DL (ref 0.55–1.02)
DIFFERENTIAL METHOD BLD: NORMAL
EOSINOPHIL # BLD: 0.1 K/UL (ref 0–0.4)
EOSINOPHIL NFR BLD: 1 % (ref 0–7)
ERYTHROCYTE [DISTWIDTH] IN BLOOD BY AUTOMATED COUNT: 11.6 % (ref 11.5–14.5)
GLOBULIN SER CALC-MCNC: 2.8 G/DL (ref 2–4)
GLUCOSE SERPL-MCNC: 85 MG/DL (ref 65–100)
HCT VFR BLD AUTO: 40.2 % (ref 35–47)
HDLC SERPL-MCNC: 82 MG/DL
HDLC SERPL: 2.4 {RATIO} (ref 0–5)
HGB BLD-MCNC: 13.6 G/DL (ref 11.5–16)
IMM GRANULOCYTES # BLD AUTO: 0 K/UL (ref 0–0.04)
IMM GRANULOCYTES NFR BLD AUTO: 0 % (ref 0–0.5)
LDLC SERPL CALC-MCNC: 101.6 MG/DL (ref 0–100)
LYMPHOCYTES # BLD: 2.6 K/UL (ref 0.8–3.5)
LYMPHOCYTES NFR BLD: 42 % (ref 12–49)
MCH RBC QN AUTO: 30.4 PG (ref 26–34)
MCHC RBC AUTO-ENTMCNC: 33.8 G/DL (ref 30–36.5)
MCV RBC AUTO: 89.7 FL (ref 80–99)
MONOCYTES # BLD: 0.5 K/UL (ref 0–1)
MONOCYTES NFR BLD: 8 % (ref 5–13)
NEUTS SEG # BLD: 2.9 K/UL (ref 1.8–8)
NEUTS SEG NFR BLD: 48 % (ref 32–75)
NRBC # BLD: 0 K/UL (ref 0–0.01)
NRBC BLD-RTO: 0 PER 100 WBC
PLATELET # BLD AUTO: 239 K/UL (ref 150–400)
PMV BLD AUTO: 10.7 FL (ref 8.9–12.9)
POTASSIUM SERPL-SCNC: 4.2 MMOL/L (ref 3.5–5.1)
PROT SERPL-MCNC: 6.7 G/DL (ref 6.4–8.2)
RBC # BLD AUTO: 4.48 M/UL (ref 3.8–5.2)
SODIUM SERPL-SCNC: 138 MMOL/L (ref 136–145)
TRIGL SERPL-MCNC: 52 MG/DL (ref ?–150)
VLDLC SERPL CALC-MCNC: 10.4 MG/DL
WBC # BLD AUTO: 6.1 K/UL (ref 3.6–11)

## 2022-12-08 ENCOUNTER — HOSPITAL ENCOUNTER (OUTPATIENT)
Dept: CT IMAGING | Age: 52
End: 2022-12-08
Attending: INTERNAL MEDICINE
Payer: COMMERCIAL

## 2022-12-08 DIAGNOSIS — R91.8 ABNORMAL FINDING ON LUNG IMAGING: ICD-10-CM

## 2022-12-08 PROCEDURE — 71250 CT THORAX DX C-: CPT

## 2022-12-16 LAB
BUN SERPL-MCNC: 18 MG/DL
BUN/CREATININE RATIO,BUCR: 18
HBA1C MFR BLD HPLC: 5.2 %
HDL CHOLESTEROL: 74
HEMOGLOBIN,17322: 14.3
LDL CHOL, CALCULATED: 120 MG/DL
Lab: 0.8
Lab: 1.6
TOTAL CHOLESTEROL, EXTERNAL: 211
TRIGLYCERIDES, 001174: 98
VLDL CHOLESTEROL, 804564: 17

## 2023-02-22 ENCOUNTER — DOCUMENTATION ONLY (OUTPATIENT)
Dept: NEUROLOGY | Age: 53
End: 2023-02-22

## 2023-02-22 ENCOUNTER — OFFICE VISIT (OUTPATIENT)
Dept: NEUROLOGY | Age: 53
End: 2023-02-22
Payer: COMMERCIAL

## 2023-02-22 VITALS
OXYGEN SATURATION: 98 % | BODY MASS INDEX: 24.25 KG/M2 | WEIGHT: 160 LBS | HEIGHT: 68 IN | SYSTOLIC BLOOD PRESSURE: 110 MMHG | DIASTOLIC BLOOD PRESSURE: 76 MMHG | HEART RATE: 57 BPM

## 2023-02-22 DIAGNOSIS — G43.709 CHRONIC MIGRAINE WITHOUT AURA WITHOUT STATUS MIGRAINOSUS, NOT INTRACTABLE: Primary | ICD-10-CM

## 2023-02-22 DIAGNOSIS — G47.33 OSA (OBSTRUCTIVE SLEEP APNEA): ICD-10-CM

## 2023-02-22 PROCEDURE — 99214 OFFICE O/P EST MOD 30 MIN: CPT | Performed by: PSYCHIATRY & NEUROLOGY

## 2023-02-22 RX ORDER — RIMEGEPANT SULFATE 75 MG/75MG
75 TABLET, ORALLY DISINTEGRATING ORAL AS NEEDED
Qty: 8 TABLET | Refills: 5 | Status: SHIPPED | OUTPATIENT
Start: 2023-02-22

## 2023-02-22 RX ORDER — RIMEGEPANT SULFATE 75 MG/75MG
75 TABLET, ORALLY DISINTEGRATING ORAL AS NEEDED
COMMUNITY
End: 2023-02-22 | Stop reason: SDUPTHER

## 2023-02-22 RX ORDER — MOMETASONE FUROATE 50 UG/1
2 SPRAY, METERED NASAL DAILY
COMMUNITY

## 2023-02-22 NOTE — PROGRESS NOTES
Per Dr. Miah Lowery gave the Pt. 1 sample Aimovig 140 mg and 2 sample boxes of nurtec. Sent a note to [facility] for PA for Botox, Aimovig and Nurtec.

## 2023-02-22 NOTE — PROGRESS NOTES
Neurology Clinic Follow up Note    Patient ID:  Billy Bender  [id number]  47 y.o.  1970      Ms. Burnett Person is here for follow up today of migraines. Last Appointment With Me:  1/21/2022    Interval History:   She reports worsening headaches over the past 2 months despite Ajovy injections. Migraines are currently daily located over the temporal region b/l upon awakening with associated cervicalgia, nausea, photophobia, brain fog, intermittent jaw soreness which she has experienced previously with her migraines. Denies vision loss, focal deficits. She is using Nurtec PRN for rescue therapy which works modestly for headache relief. Olfactory aura has resolved. Previously diagnosed with AMARI but reports improvement following weight loss. PMHx/ PSHx/ FHx/ SHx:  Reviewed and unchanged previous visit. Past Medical History:   Diagnosis Date    Allergic rhinitis, cause unspecified     Calculus of kidney     Calculus of kidney     Colon polyp 11/14/2016    Depression     Headache(784.0)     Hepatic cyst 9/7/2016    HX OTHER MEDICAL     migraines and kidney stones    Migraine          ROS:  Comprehensive review of systems negative except for as noted above. Objective:       Meds:  Current Outpatient Medications   Medication Sig Dispense Refill    mometasone (Nasonex) 50 mcg/actuation nasal spray 2 Sprays daily. rimegepant (Nurtec ODT) 75 mg disintegrating tablet Take 75 mg by mouth as needed for Migraine. Trulance 3 mg tab       Ajovy Autoinjector 225 mg/1.5 mL auto-injector INJECT THE CONTENTS OF 1 PEN UNDER THE SKIN ONCE MONTHLY 1.5 mL 5    multivitamin (ONE A DAY) tablet Take 1 Tab by mouth daily.          Exam:  Visit Vitals  /76   Pulse (!) 57   Ht 5' 8\" (1.727 m)   Wt 160 lb (72.6 kg)   LMP 11/02/2016   SpO2 98%   BMI 24.33 kg/m²     NEUROLOGICAL EXAM:  General: Awake, alert, speech fluent  CN: PERRL, EOMI without nystagmus, VFF to confrontation, facial sensation and strength are normal and symmetric, hearing is intact to finger rub bilaterally, palate and tongue movements are intact and symmetric. Motor: Normal tone, bulk and strength bilaterally. Reflexes: 1/4 throughout  Coordination: FNF, ALEXIA, HTS intact. Sensation: LT intact throughout. Gait: Normal-based and steady. LABS  Results for orders placed or performed in visit on 12/16/22   HEMOGLOBIN   Result Value Ref Range    HEMOGLOBIN 14.3     BUN 18 MG/DL    Creatine/Creatinine 0.80     BUN/Creatinine ratio 18     Total Cholesterol, External 211     Triglycerides 98     HDL CHOLESTEROL 74     VLDL Cholesterol 17     LDL CHOL, CALCULATED 120 MG/DL    LDL (Dir.)/HDL Ratio 1.6     Hemoglobin A1c 5.2 %       IMAGING:  MRI Results (most recent):  Results from Hospital Encounter encounter on 06/14/21    MRI BRAIN W WO CONT    Narrative  Clinical history: olfactory aura, chronic headaches  INDICATION:   olfactory aura, chronic headaches    COMPARISON: None  TECHNIQUE: MR examination of the brain includes axial and sagittal T1 , axial  T2, axial FLAIR, axial gradient echo, axial DWI, coronal T1 . Pre and post  contrast axial T1-weighted imaging. Postcontrast T1-weighted imaging coronal  plane. CONTRAST: 15 ml ProHance  FINDINGS:  There is no intracranial mass, hemorrhage or acute infarction. IACs are symmetric in size. . There is no abnormal parenchymal enhancement. There  is no abnormal meningeal enhancement demonstrated. The brain architecture is  normal. There is no evidence of midline shift or mass-effect. The ventricles are  normal in size, position and configuration. There are no extra-axial fluid  collections. Major intracranial vascular flow-voids are unremarkable. The orbits  are grossly symmetric. Dural venous sinuses are grossly unremarkable. There is  no Chiari or syrinx. Pituitary and infundibulum grossly unremarkable. Cerebellopontine angles are unremarkable. No skull base mass is identified.   Cavernous sinuses are symmetric. The mastoid air cells and are well pneumatized  and clear. Impression  Normal MRI of the brain. No intracranial mass, hemorrhage or evidence of acute infarction. Assessment:     Encounter Diagnoses     ICD-10-CM ICD-9-CM   1. Chronic migraine without aura without status migrainosus, not intractable  G43.709 346.70   2. AMARI (obstructive sleep apnea)  G47.33 327.23      47 y.o. female with a h/o AMARI followed for chronic migraines since childhood. Headaches have been refractory to betablockers, TPM IR/ER, SSRIs previously. MRI Brain previously reviewed without acute pathology. PSG completed with recommendations for CPAP, however she has noted improvement with sleep disordered breathing after weight loss. Headaches were improved on Ajovy injections, however she has noted waning efficacy over the past 2 months. We discussed alternative treatment options for her chronic migraines including botox injections. Side effects may include worsening headache after initial injections, ptosis, muscle weakness which may generalize, rarely anaphylaxis. Will check ESR level as well due to reported jaw pain with headaches, although lower suspicion for vasculitis/TA. Headache education  Discussed pathophysiology of headache. Discussed treatment options, both abortive and preventive medications. Instructed patient about medications and potential side effects. Plan:   ESR level   D/C Ajovy injections, start Botox injections for migraine prophylaxis   Continue Nurtec PRN for migraine rescue therapy       Follow-up and Dispositions    Return in about 2 months (around 4/22/2023). I have discussed the diagnosis with the patient today and the intended plan as seen in the above orders with both the patient as well as referring provider and/or PCP via electronic correspondence. The patient has received an after-visit summary and questions were answered concerning future plans.  I have discussed medication side effects and warnings with the patient as well.       Signed:  Shi Sanchez,   2/22/2023

## 2023-02-23 ENCOUNTER — TELEPHONE (OUTPATIENT)
Dept: NEUROLOGY | Age: 53
End: 2023-02-23

## 2023-02-23 LAB — ERYTHROCYTE [SEDIMENTATION RATE] IN BLOOD BY WESTERGREN METHOD: 2 MM/HR (ref 0–40)

## 2023-03-06 ENCOUNTER — TELEPHONE (OUTPATIENT)
Dept: NEUROLOGY | Age: 53
End: 2023-03-06

## 2023-04-18 ENCOUNTER — HOSPITAL ENCOUNTER (EMERGENCY)
Age: 53
Discharge: HOME | End: 2023-04-18
Attending: EMERGENCY MEDICINE
Payer: COMMERCIAL

## 2023-04-18 ENCOUNTER — TELEPHONE (OUTPATIENT)
Dept: INTERNAL MEDICINE CLINIC | Age: 53
End: 2023-04-18

## 2023-04-18 VITALS
HEART RATE: 59 BPM | SYSTOLIC BLOOD PRESSURE: 110 MMHG | TEMPERATURE: 98.5 F | RESPIRATION RATE: 16 BRPM | HEIGHT: 68 IN | DIASTOLIC BLOOD PRESSURE: 73 MMHG | BODY MASS INDEX: 24.25 KG/M2 | WEIGHT: 160 LBS | OXYGEN SATURATION: 99 %

## 2023-04-18 DIAGNOSIS — S61.011A LACERATION OF RIGHT THUMB WITHOUT FOREIGN BODY WITHOUT DAMAGE TO NAIL, INITIAL ENCOUNTER: Primary | ICD-10-CM

## 2023-04-18 PROCEDURE — 75810000293 HC SIMP/SUPERF WND  RPR

## 2023-04-18 PROCEDURE — 74011250636 HC RX REV CODE- 250/636: Performed by: EMERGENCY MEDICINE

## 2023-04-18 PROCEDURE — 99284 EMERGENCY DEPT VISIT MOD MDM: CPT

## 2023-04-18 PROCEDURE — 90714 TD VACC NO PRESV 7 YRS+ IM: CPT | Performed by: EMERGENCY MEDICINE

## 2023-04-18 PROCEDURE — 90471 IMMUNIZATION ADMIN: CPT

## 2023-04-18 RX ORDER — CEPHALEXIN 500 MG/1
500 CAPSULE ORAL 4 TIMES DAILY
Qty: 12 CAPSULE | Refills: 0 | Status: SHIPPED | OUTPATIENT
Start: 2023-04-18 | End: 2023-04-21

## 2023-04-18 RX ADMIN — CLOSTRIDIUM TETANI TOXOID ANTIGEN (FORMALDEHYDE INACTIVATED) AND CORYNEBACTERIUM DIPHTHERIAE TOXOID ANTIGEN (FORMALDEHYDE INACTIVATED) 0.5 ML: 5; 2 INJECTION, SUSPENSION INTRAMUSCULAR at 10:30

## 2023-04-18 NOTE — TELEPHONE ENCOUNTER
Patient called in to state she is in the ER. Patient wanted to know when the last time was she had a tetanus shot. PSR checked her chart and confirmed with the nurse that it was back in 2010. Patient was thankful for the information.

## 2023-04-18 NOTE — ED PROVIDER NOTES
52-year-old generally healthy female presenting to the ER for evaluation of a right thumb laceration sustained while cleaning a  blade. Prior to arrival.  Unclear when last tetanus was. She washed the wound and applied some antibiotic cream and wrapped it up tight. She reports it kept bleeding so she presents for evaluation. Not on any anticoagulation    The history is provided by the patient. Laceration   Pertinent negatives include no numbness.       Past Medical History:   Diagnosis Date    Allergic rhinitis, cause unspecified     Calculus of kidney     Calculus of kidney     Colon polyp 11/14/2016    Depression     Headache(784.0)     Hepatic cyst 9/7/2016    HX OTHER MEDICAL     migraines and kidney stones    Migraine        Past Surgical History:   Procedure Laterality Date    COLONOSCOPY N/A 11/14/2016    COLONOSCOPY WITH ENDOSCOPIC SUBMUCOSAL DISSECTION (ESD) performed by Khadar Guzman MD at [address] Right 2016    neg;ductogram    HX BREAST BIOPSY Right 2016    Neg; 2 MRI bx after ductogram    HX BREAST BIOPSY Left 2016    neg; mri bx    HX COLONOSCOPY  2016    HX GYN      novasure procedure    HX HEENT      tonsils and adenoids - once as of 11/11/2016    HX HYSTERECTOMY  2016    HX ORTHOPAEDIC      frozen (R) shoulder    HX OTHER SURGICAL      broken right arm x2 - no surgery    HX SKIN BIOPSY      HX TONSIL AND ADENOIDECTOMY  1978         Family History:   Problem Relation Age of Onset    Thyroid Disease Mother     Cancer Mother         skin - BCCA/squamous    Hypertension Father     Heart Disease Father     Other Father         ankylosing spondylitis    Elevated Lipids Father     Hypertension Paternal Grandmother     Elevated Lipids Paternal Grandmother     Breast Cancer Maternal Aunt 48    Cancer Maternal Aunt         breast    Heart Disease Paternal Grandfather        Social History     Socioeconomic History    Marital status:      Spouse name: Clemente ()    Number of children: Not on file    Years of education: Not on file    Highest education level: Not on file   Occupational History    Occupation: Homemaker   Tobacco Use    Smoking status: Never    Smokeless tobacco: Never   Vaping Use    Vaping Use: Never used   Substance and Sexual Activity    Alcohol use: Yes     Comment: 1-2 drinks per week    Drug use: No    Sexual activity: Yes     Partners: Male     Birth control/protection: None   Other Topics Concern    Not on file   Social History Narrative    Not on file     Social Determinants of Health     Financial Resource Strain: Not on file   Food Insecurity: Not on file   Transportation Needs: Not on file   Physical Activity: Not on file   Stress: Not on file   Social Connections: Not on file   Intimate Partner Violence: Not on file   Housing Stability: Not on file         ALLERGIES: Latex, Lortab [hydrocodone-acetaminophen], Adhesive tape-silicones, Fioricet [butalbital-acetaminophen-caff], and Topamax [topiramate]    Review of Systems   Musculoskeletal:  Negative for arthralgias. Skin:  Positive for wound. Neurological:  Negative for numbness. Vitals:    04/18/23 0927   BP: 110/73   Pulse: (!) 59   Resp: 16   Temp: 98.5 °F (36.9 °C)   SpO2: 99%   Weight: 72.6 kg (160 lb)   Height: 5' 8\" (1.727 m)            Physical Exam  Vitals and nursing note reviewed. Constitutional:       Appearance: Normal appearance. HENT:      Head: Normocephalic and atraumatic. Eyes:      General: No scleral icterus. Cardiovascular:      Rate and Rhythm: Normal rate and regular rhythm. Pulmonary:      Effort: Pulmonary effort is normal.   Skin:     General: Skin is warm and dry. Findings: Laceration (Pad of right thumb, bleeding controlled and stopped, well opposed) present. Neurological:      Mental Status: She is alert and oriented to person, place, and time.    Psychiatric:         Mood and Affect: Mood normal.        Medical Decision Making  Updated tetanus  Laceration with bleeding controlled. Irrigated with cleaning solution. Repair with Dermabond. Given nature and location of wound, 3 days of keflex. Return for any evidence of infection. Advised keeping protected with activities (patient eager for gardening)    Risk  Prescription drug management.            Wound Closure by Adhesive    Date/Time: 4/18/2023 9:54 AM  Performed by: Thania Harris MD  Authorized by: Thania Harris MD     Consent:     Consent obtained:  Verbal    Consent given by:  Patient    Risks, benefits, and alternatives were discussed: yes    Laceration details:     Location:  Finger    Finger location:  R thumb    Length (cm):  1.5  Treatment:     Area cleansed with:  Sourav    Amount of cleaning:  Standard    Irrigation method:  Syringe    Debridement:  None  Skin repair:     Repair method:  Tissue adhesive  Approximation:     Approximation:  Close  Repair type:     Repair type:  Simple  Post-procedure details:     Dressing:  Open (no dressing)    Procedure completion:  Tolerated well, no immediate complications

## 2023-04-18 NOTE — ED TRIAGE NOTES
Patient presents ambulatory to treatment area with a steady gait. Patient states she cut her right distal thumb on a  blade this morning at around 0815. Unknown last tdap vaccination date. States she was unable to get the bleeding to stop. Bleeding controlled in triage.

## 2023-04-19 ENCOUNTER — TELEPHONE (OUTPATIENT)
Dept: NEUROLOGY | Age: 53
End: 2023-04-19

## 2023-04-19 NOTE — TELEPHONE ENCOUNTER
Called the Pt. To notify PA for Botox approved actually did not need a PA. Appt. Is 4/25. She has questions which I let her know I would forward to Dr. Peggy Urrutia:    What are the long term possible effects of Botox if any. Can she received botox in neck and head but not her face. She is nervous about this.

## 2023-04-19 NOTE — TELEPHONE ENCOUNTER
Re: botox    Called Cassandra of [address] [address] at [phone] and reviewed cpt [id number] and PA is not required per confirmation #[id number] requested fax. Faxed script out to local [facility] to see if they can process.

## 2023-04-20 ENCOUNTER — TELEPHONE (OUTPATIENT)
Dept: NEUROLOGY | Age: 53
End: 2023-04-20

## 2023-04-20 NOTE — TELEPHONE ENCOUNTER
Pt requesting to cancel Botox appt on 4/25/23. Stated she is really nervous about procedure. Please contact.

## 2023-04-20 NOTE — TELEPHONE ENCOUNTER
Called patient. Informed her that the doctor stated, \"We can certainly avoid the face if she would like to but may not have as robust effect as the typical migraine protocol with her botox. The effect of the botox only last for 3 months at a time. Side effects may include transient worsening headache after initial injections, ptosis if injection too low on the forehead, muscle weakness which may rarely generalize. \" Patient verbalizes understanding but is still nervous to get it. She has already cancelled her appointment. States her Kyle Paz has been working well for her as of right now. States she will decide if she would like to continue botox.

## 2023-04-21 ENCOUNTER — TELEPHONE (OUTPATIENT)
Dept: NEUROLOGY | Age: 53
End: 2023-04-21

## 2023-04-21 NOTE — TELEPHONE ENCOUNTER
Called and spoke to the Pt. She has decided to hold off on the Botox inj for now and continue with the Aimovig inj. First.  Did not want a Follow up appt. At this time will speak to Dr. Ayana Velazquez in the future.

## 2023-04-21 NOTE — TELEPHONE ENCOUNTER
Re: Botox    Rcvd e-mail from local [facility] that they were able to process and would reach out to pt for consent. Rcvd another e-mail same day at 11am and [facility] advised that pt told them she is not going to continue botox at this time. Sent update to nurse.

## 2023-06-26 ENCOUNTER — OFFICE VISIT (OUTPATIENT)
Age: 53
End: 2023-06-26

## 2023-06-26 VITALS
HEIGHT: 68 IN | WEIGHT: 162.4 LBS | BODY MASS INDEX: 24.61 KG/M2 | HEART RATE: 63 BPM | RESPIRATION RATE: 16 BRPM | DIASTOLIC BLOOD PRESSURE: 84 MMHG | OXYGEN SATURATION: 98 % | TEMPERATURE: 97.4 F | SYSTOLIC BLOOD PRESSURE: 123 MMHG

## 2023-06-26 DIAGNOSIS — F41.9 ANXIETY: ICD-10-CM

## 2023-06-26 DIAGNOSIS — R00.2 PALPITATIONS: Primary | ICD-10-CM

## 2023-06-26 DIAGNOSIS — R00.2 PALPITATIONS: ICD-10-CM

## 2023-06-26 LAB
ANION GAP SERPL CALC-SCNC: 5 MMOL/L (ref 5–15)
BUN SERPL-MCNC: 16 MG/DL (ref 6–20)
BUN/CREAT SERPL: 20 (ref 12–20)
CALCIUM SERPL-MCNC: 9.4 MG/DL (ref 8.5–10.1)
CHLORIDE SERPL-SCNC: 105 MMOL/L (ref 97–108)
CO2 SERPL-SCNC: 28 MMOL/L (ref 21–32)
CREAT SERPL-MCNC: 0.81 MG/DL (ref 0.55–1.02)
ERYTHROCYTE [DISTWIDTH] IN BLOOD BY AUTOMATED COUNT: 11.8 % (ref 11.5–14.5)
GLUCOSE SERPL-MCNC: 90 MG/DL (ref 65–100)
HCT VFR BLD AUTO: 40.5 % (ref 35–47)
HGB BLD-MCNC: 13.5 G/DL (ref 11.5–16)
MCH RBC QN AUTO: 29.9 PG (ref 26–34)
MCHC RBC AUTO-ENTMCNC: 33.3 G/DL (ref 30–36.5)
MCV RBC AUTO: 89.6 FL (ref 80–99)
NRBC # BLD: 0 K/UL (ref 0–0.01)
NRBC BLD-RTO: 0 PER 100 WBC
PLATELET # BLD AUTO: 224 K/UL (ref 150–400)
PMV BLD AUTO: 11 FL (ref 8.9–12.9)
POTASSIUM SERPL-SCNC: 3.8 MMOL/L (ref 3.5–5.1)
RBC # BLD AUTO: 4.52 M/UL (ref 3.8–5.2)
SODIUM SERPL-SCNC: 138 MMOL/L (ref 136–145)
T4 FREE SERPL-MCNC: 1.1 NG/DL (ref 0.8–1.5)
TSH SERPL DL<=0.05 MIU/L-ACNC: 1.34 UIU/ML (ref 0.36–3.74)
WBC # BLD AUTO: 6.6 K/UL (ref 3.6–11)

## 2023-06-26 PROCEDURE — 93010 ELECTROCARDIOGRAM REPORT: CPT | Performed by: INTERNAL MEDICINE

## 2023-06-26 PROCEDURE — 99214 OFFICE O/P EST MOD 30 MIN: CPT | Performed by: INTERNAL MEDICINE

## 2023-06-26 PROCEDURE — 93005 ELECTROCARDIOGRAM TRACING: CPT | Performed by: INTERNAL MEDICINE

## 2023-06-26 SDOH — ECONOMIC STABILITY: HOUSING INSECURITY
IN THE LAST 12 MONTHS, WAS THERE A TIME WHEN YOU DID NOT HAVE A STEADY PLACE TO SLEEP OR SLEPT IN A SHELTER (INCLUDING NOW)?: NO

## 2023-06-26 SDOH — ECONOMIC STABILITY: FOOD INSECURITY: WITHIN THE PAST 12 MONTHS, THE FOOD YOU BOUGHT JUST DIDN'T LAST AND YOU DIDN'T HAVE MONEY TO GET MORE.: NEVER TRUE

## 2023-06-26 SDOH — ECONOMIC STABILITY: TRANSPORTATION INSECURITY
IN THE PAST 12 MONTHS, HAS LACK OF TRANSPORTATION KEPT YOU FROM MEETINGS, WORK, OR FROM GETTING THINGS NEEDED FOR DAILY LIVING?: NO

## 2023-06-26 SDOH — ECONOMIC STABILITY: INCOME INSECURITY: HOW HARD IS IT FOR YOU TO PAY FOR THE VERY BASICS LIKE FOOD, HOUSING, MEDICAL CARE, AND HEATING?: NOT HARD AT ALL

## 2023-06-26 SDOH — ECONOMIC STABILITY: FOOD INSECURITY: WITHIN THE PAST 12 MONTHS, YOU WORRIED THAT YOUR FOOD WOULD RUN OUT BEFORE YOU GOT MONEY TO BUY MORE.: NEVER TRUE

## 2023-06-26 ASSESSMENT — PATIENT HEALTH QUESTIONNAIRE - PHQ9
1. LITTLE INTEREST OR PLEASURE IN DOING THINGS: 0
SUM OF ALL RESPONSES TO PHQ QUESTIONS 1-9: 0
SUM OF ALL RESPONSES TO PHQ QUESTIONS 1-9: 0
2. FEELING DOWN, DEPRESSED OR HOPELESS: 0
SUM OF ALL RESPONSES TO PHQ QUESTIONS 1-9: 0
SUM OF ALL RESPONSES TO PHQ QUESTIONS 1-9: 0
SUM OF ALL RESPONSES TO PHQ9 QUESTIONS 1 & 2: 0

## 2023-10-31 ENCOUNTER — OFFICE VISIT (OUTPATIENT)
Age: 53
End: 2023-10-31
Payer: COMMERCIAL

## 2023-10-31 VITALS
HEART RATE: 66 BPM | DIASTOLIC BLOOD PRESSURE: 84 MMHG | SYSTOLIC BLOOD PRESSURE: 122 MMHG | WEIGHT: 171 LBS | HEIGHT: 68 IN | BODY MASS INDEX: 25.91 KG/M2 | OXYGEN SATURATION: 99 %

## 2023-10-31 DIAGNOSIS — Z71.89 CARDIAC RISK COUNSELING: Primary | ICD-10-CM

## 2023-10-31 DIAGNOSIS — R00.2 PALPITATION: ICD-10-CM

## 2023-10-31 DIAGNOSIS — R53.83 FATIGUE: ICD-10-CM

## 2023-10-31 DIAGNOSIS — Z82.49 FAMILY HISTORY OF HEART DISEASE: ICD-10-CM

## 2023-10-31 PROCEDURE — 99204 OFFICE O/P NEW MOD 45 MIN: CPT | Performed by: INTERNAL MEDICINE

## 2023-10-31 NOTE — PROGRESS NOTES
Patient: Julianne Cooley  : 1970    Primary Cardiologist:   BRANDI Cardiologist:  PCP: Judi Mcdaniel MD    Today's Date: 10/31/2023      ASSESSMENT AND PLAN:     Assessment and Plan:  Cardiac risk assessment  -echo  -CCS      Follow up with after above done. No diagnosis found. HISTORY OF PRESENT ILLNESS:     History of Present Illness:  Julianne Cooley is a 47 y.o. female who presents for cardiac risk assessment. Panic attacks, reduced coffee. ++ FH dad heart attack at age 72, PPM.  Watchman. PGF two heart attacks,  age 71 with second heart attacks. Tobacco/pipe smoker. Meat. Uncle CAD, COPD.      ++ panic attacks, felt like room was spinning, lasted for an hour. Felt odd. No siri pressure, more fluttering uncomfortable feeling. Stopped coffee. [facility] hosp[facility]. Warm sensation on right calf. EKG SB HR 54.     ++ fatigue. Denies chest pain, edema, syncope, shortness of breath at rest, dyspnea on exertion, PND or orthopnea. Has no tachycardia, palpitations or sense of arrhythmia.      PAST MEDICAL HISTORY:     Past Medical History:   Diagnosis Date    Allergic rhinitis, cause unspecified     Calculus of kidney     Calculus of kidney     Colon polyp 2016    Depression     Headache(784.0)     Hepatic cyst 2016    Migraine        Past Surgical History:   Procedure Laterality Date    BREAST BIOPSY Right     Neg; 2 MRI bx after ductogram    BREAST BIOPSY Left     neg; mri bx    BREAST BIOPSY Right     neg;ductogram    COLONOSCOPY N/A 2016    COLONOSCOPY WITH ENDOSCOPIC SUBMUCOSAL DISSECTION (ESD) performed by Donnell Fonseca MD at [facility] ENDOSCOPY    COLONOSCOPY  2016    GYN      novasure procedure    HEENT      tonsils and adenoids - once as of 2016    HYSTERECTOMY (CERVIX STATUS UNKNOWN)  2016    ORTHOPEDIC SURGERY      frozen (R) shoulder    OTHER SURGICAL HISTORY      broken right arm x2 - no surgery    SKIN

## 2023-10-31 NOTE — PROGRESS NOTES
Chief Complaint   Patient presents with    Hyperlipidemia    New Patient     Vitals:    10/31/23 0831   BP: 122/84   Site: Left Upper Arm   Position: Sitting   Pulse: 66   SpO2: 99%   Weight: 77.6 kg (171 lb)   Height: 1.727 m (5' 8\")       Chest pain: DENIED     SOB: W/ STAIRS, INCLINE     Palpitations: FLUTTER W/ ACTIVITY      Swelling in hands/feet: LEFT LEG WARM     Dizziness: WHOOSH, BRAIN IS SCRAMBLED      Recent hospital stays: DENIED     Refills: DENIED

## 2023-11-09 ENCOUNTER — TRANSCRIBE ORDERS (OUTPATIENT)
Facility: HOSPITAL | Age: 53
End: 2023-11-09

## 2023-11-09 DIAGNOSIS — Z12.31 SCREENING MAMMOGRAM FOR HIGH-RISK PATIENT: Primary | ICD-10-CM

## 2023-11-27 ENCOUNTER — HOSPITAL ENCOUNTER (OUTPATIENT)
Facility: HOSPITAL | Age: 53
Discharge: HOME | End: 2023-11-30
Attending: INTERNAL MEDICINE

## 2023-11-27 DIAGNOSIS — Z82.49 FAMILY HISTORY OF HEART DISEASE: ICD-10-CM

## 2023-11-27 DIAGNOSIS — R00.2 PALPITATION: ICD-10-CM

## 2023-11-27 DIAGNOSIS — R53.83 FATIGUE: ICD-10-CM

## 2023-11-27 DIAGNOSIS — Z71.89 CARDIAC RISK COUNSELING: ICD-10-CM

## 2023-11-27 PROCEDURE — 75571 CT HRT W/O DYE W/CA TEST: CPT

## 2023-11-28 NOTE — RESULT ENCOUNTER NOTE
Good news! Your coronary calcium score was ZERO, which is very low risk. Please call with office with further questions.     Dr. Huerta Draft

## 2023-12-12 ENCOUNTER — OFFICE VISIT (OUTPATIENT)
Age: 53
End: 2023-12-12
Payer: COMMERCIAL

## 2023-12-12 VITALS
OXYGEN SATURATION: 98 % | SYSTOLIC BLOOD PRESSURE: 110 MMHG | WEIGHT: 169 LBS | HEIGHT: 68 IN | DIASTOLIC BLOOD PRESSURE: 80 MMHG | BODY MASS INDEX: 25.61 KG/M2 | HEART RATE: 74 BPM

## 2023-12-12 DIAGNOSIS — R00.2 PALPITATIONS: Primary | ICD-10-CM

## 2023-12-12 PROCEDURE — 99214 OFFICE O/P EST MOD 30 MIN: CPT | Performed by: INTERNAL MEDICINE

## 2023-12-12 ASSESSMENT — PATIENT HEALTH QUESTIONNAIRE - PHQ9
SUM OF ALL RESPONSES TO PHQ QUESTIONS 1-9: 0
1. LITTLE INTEREST OR PLEASURE IN DOING THINGS: 0
SUM OF ALL RESPONSES TO PHQ QUESTIONS 1-9: 0
SUM OF ALL RESPONSES TO PHQ9 QUESTIONS 1 & 2: 0
SUM OF ALL RESPONSES TO PHQ QUESTIONS 1-9: 0
SUM OF ALL RESPONSES TO PHQ QUESTIONS 1-9: 0
2. FEELING DOWN, DEPRESSED OR HOPELESS: 0

## 2023-12-12 NOTE — PROGRESS NOTES
Patient: Velvet Cardona  : 1970    Primary Cardiologist:   BRANDI Cardiologist:  PCP: Aleksandra Nathan MD    Today's Date: 2023      ASSESSMENT AND PLAN:     Assessment and Plan:  Cardiac risk assessment  -echo normal  -CCS ZERO  Low risk  Symptoms resolved after less caffeine  Moving to [place]      Follow up with after above done. No diagnosis found. HISTORY OF PRESENT ILLNESS:     History of Present Illness:  Velvet Cardona is a 47 y.o. female who presents for cardiac risk assessment. Panic attacks, reduced coffee. ++ FH dad heart attack at age 72, PPM.  Watchman. PGF two heart attacks,  age 71 with second heart attacks. Tobacco/pipe smoker. Meat. Uncle CAD, COPD.      ++ panic attacks, felt like room was spinning, lasted for an hour. Felt odd. No siri pressure, more fluttering uncomfortable feeling. Stopped coffee. Warm sensation on right calf. EKG SB HR 54.     ++ fatigue.       PAST MEDICAL HISTORY:     Past Medical History:   Diagnosis Date    Allergic rhinitis, cause unspecified     Calculus of kidney     Calculus of kidney     Colon polyp 2016    Depression     Headache(784.0)     Hepatic cyst 2016    Migraine        Past Surgical History:   Procedure Laterality Date    BREAST BIOPSY Right     Neg; 2 MRI bx after ductogram    BREAST BIOPSY Left     neg; mri bx    BREAST BIOPSY Right     neg;ductogram    COLONOSCOPY N/A 2016    COLONOSCOPY WITH ENDOSCOPIC SUBMUCOSAL DISSECTION (ESD) performed by Dory Knutson MD at [facility] ENDOSCOPY    COLONOSCOPY  2016    GYN      novasure procedure    HEENT      tonsils and adenoids - once as of 2016    HYSTERECTOMY (CERVIX STATUS UNKNOWN)  2016    ORTHOPEDIC SURGERY      frozen (R) shoulder    OTHER SURGICAL HISTORY      broken right arm x2 - no surgery    SKIN BIOPSY      TONSILLECTOMY AND ADENOIDECTOMY         CURRENT MEDICATIONS:    .  Current

## 2023-12-12 NOTE — PROGRESS NOTES
Chief Complaint   Patient presents with    Follow-up     Result f/u      Vitals:    12/12/23 0911   BP: 110/80   Site: Left Upper Arm   Position: Sitting   Pulse: 74   SpO2: 98%   Weight: 76.7 kg (169 lb)   Height: 1.727 m (5' 8\")         Chest pain denied     SOB denied     Palpitations denied     Swelling in hands/feet denied     Dizziness denied     Recent hospital stays denied     Refills denied

## 2024-06-20 ENCOUNTER — APPOINTMENT (OUTPATIENT)
Facility: HOSPITAL | Age: 54
End: 2024-06-20
Payer: COMMERCIAL

## 2024-06-20 ENCOUNTER — HOSPITAL ENCOUNTER (EMERGENCY)
Facility: HOSPITAL | Age: 54
Discharge: HOME | End: 2024-06-20
Attending: EMERGENCY MEDICINE
Payer: COMMERCIAL

## 2024-06-20 VITALS
TEMPERATURE: 98.4 F | SYSTOLIC BLOOD PRESSURE: 129 MMHG | WEIGHT: 176 LBS | HEART RATE: 63 BPM | BODY MASS INDEX: 26.67 KG/M2 | HEIGHT: 68 IN | RESPIRATION RATE: 18 BRPM | DIASTOLIC BLOOD PRESSURE: 84 MMHG | OXYGEN SATURATION: 98 %

## 2024-06-20 DIAGNOSIS — S62.614A CLOSED DISPLACED FRACTURE OF PROXIMAL PHALANX OF RIGHT RING FINGER, INITIAL ENCOUNTER: Primary | ICD-10-CM

## 2024-06-20 PROCEDURE — 26742 TREAT FINGER FRACTURE EACH: CPT

## 2024-06-20 PROCEDURE — 73140 X-RAY EXAM OF FINGER(S): CPT

## 2024-06-20 PROCEDURE — 99283 EMERGENCY DEPT VISIT LOW MDM: CPT

## 2024-06-20 RX ORDER — OXYCODONE HYDROCHLORIDE AND ACETAMINOPHEN 5; 325 MG/1; MG/1
1 TABLET ORAL EVERY 6 HOURS PRN
Qty: 12 TABLET | Refills: 0 | Status: SHIPPED | OUTPATIENT
Start: 2024-06-20 | End: 2024-06-20

## 2024-06-20 RX ORDER — OXYCODONE HYDROCHLORIDE AND ACETAMINOPHEN 5; 325 MG/1; MG/1
1 TABLET ORAL EVERY 6 HOURS PRN
Qty: 12 TABLET | Refills: 0 | Status: SHIPPED | OUTPATIENT
Start: 2024-06-20 | End: 2024-06-23

## 2024-06-20 ASSESSMENT — LIFESTYLE VARIABLES
HOW OFTEN DO YOU HAVE A DRINK CONTAINING ALCOHOL: NEVER
HOW MANY STANDARD DRINKS CONTAINING ALCOHOL DO YOU HAVE ON A TYPICAL DAY: PATIENT DOES NOT DRINK

## 2024-06-20 ASSESSMENT — PAIN DESCRIPTION - LOCATION: LOCATION: FINGER (COMMENT WHICH ONE)

## 2024-06-20 ASSESSMENT — PAIN DESCRIPTION - ORIENTATION: ORIENTATION: RIGHT

## 2024-06-20 ASSESSMENT — PAIN SCALES - GENERAL
PAINLEVEL_OUTOF10: 0
PAINLEVEL_OUTOF10: 5

## 2024-06-20 ASSESSMENT — PAIN - FUNCTIONAL ASSESSMENT: PAIN_FUNCTIONAL_ASSESSMENT: 0-10

## 2024-06-20 ASSESSMENT — PAIN DESCRIPTION - DESCRIPTORS: DESCRIPTORS: ACHING

## 2024-06-20 NOTE — ED TRIAGE NOTES
Pt arrived with complaint of right finger injury.  Pt reports she was walking her dog and the dog pulled and pts finger got caught in the leash.  Pt noted with deformity of right ring finger, pt noted with ice pack in place.  Pt reports she took a percocet PTA.  Pt is awake alert and oriented X 4,  pt educated on ER flow

## 2024-06-20 NOTE — ED PROVIDER NOTES
[facility] EMERGENCY DEP  EMERGENCY DEPARTMENT ENCOUNTER       Pt Name: Lizett Jackson  MRN: [id number]  Birthdate 1970  Date of evaluation: 6/20/2024  Provider: Jovani Pleitez DO   PCP: Amandeep Galindo MD  Note Started: 1:19 PM EDT 6/20/24     CHIEF COMPLAINT       Chief Complaint   Patient presents with    Finger Injury        HISTORY OF PRESENT ILLNESS: 1 or more elements      History From: patient, History limited by: none     Lizett Jackson is a 53 y.o. female presents to the emergency department secondary to right fourth finger injury.       Please See MDM for Additional Details of the HPI/PMH  Nursing Notes were all reviewed and agreed with or any disagreements were addressed in the HPI.     REVIEW OF SYSTEMS        Positives and Pertinent negatives as per HPI.    PAST HISTORY     Past Medical History:  Past Medical History:   Diagnosis Date    Allergic rhinitis, cause unspecified     Calculus of kidney     Calculus of kidney     Colon polyp 11/14/2016    Depression     Headache(784.0)     Hepatic cyst 9/7/2016    Migraine        Past Surgical History:  Past Surgical History:   Procedure Laterality Date    BREAST BIOPSY Right 2016    Neg; 2 MRI bx after ductogram    BREAST BIOPSY Left 2016    neg; mri bx    BREAST BIOPSY Right 2016    neg;ductogram    COLONOSCOPY N/A 11/14/2016    COLONOSCOPY WITH ENDOSCOPIC SUBMUCOSAL DISSECTION (ESD) performed by Xochilt Albarran MD at [facility] ENDOSCOPY    COLONOSCOPY  2016    GYN      novasure procedure    HEENT      tonsils and adenoids - once as of 11/11/2016    HYSTERECTOMY (CERVIX STATUS UNKNOWN)  2016    ORTHOPEDIC SURGERY      frozen (R) shoulder    OTHER SURGICAL HISTORY      broken right arm x2 - no surgery    SKIN BIOPSY      TONSILLECTOMY AND ADENOIDECTOMY  1978       Family History:  Family History   Problem Relation Age of Onset    Cancer Maternal Aunt         breast    Heart Disease Paternal Grandfather     Thyroid Disease Mother     Cancer Mother         skin

## 2024-07-26 RX ORDER — RIMEGEPANT SULFATE 75 MG/75MG
TABLET, ORALLY DISINTEGRATING ORAL
Qty: 8 TABLET | Refills: 4 | OUTPATIENT
Start: 2024-07-26

## 2024-12-11 ENCOUNTER — TRANSCRIBE ORDERS (OUTPATIENT)
Facility: HOSPITAL | Age: 54
End: 2024-12-11

## 2024-12-11 DIAGNOSIS — Z12.31 VISIT FOR SCREENING MAMMOGRAM: Primary | ICD-10-CM

## 2025-01-08 ENCOUNTER — HOSPITAL ENCOUNTER (OUTPATIENT)
Facility: HOSPITAL | Age: 55
Discharge: HOME | End: 2025-01-11
Payer: COMMERCIAL

## 2025-01-08 DIAGNOSIS — Z12.31 VISIT FOR SCREENING MAMMOGRAM: ICD-10-CM

## 2025-01-08 PROCEDURE — 77067 SCR MAMMO BI INCL CAD: CPT
